# Patient Record
Sex: MALE | Race: WHITE | NOT HISPANIC OR LATINO | Employment: FULL TIME | URBAN - METROPOLITAN AREA
[De-identification: names, ages, dates, MRNs, and addresses within clinical notes are randomized per-mention and may not be internally consistent; named-entity substitution may affect disease eponyms.]

---
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
## 2022-08-26 ENCOUNTER — OFFICE VISIT (OUTPATIENT)
Dept: FAMILY MEDICINE CLINIC | Facility: CLINIC | Age: 22
End: 2022-08-26

## 2022-08-26 VITALS
SYSTOLIC BLOOD PRESSURE: 118 MMHG | WEIGHT: 168 LBS | HEIGHT: 70 IN | TEMPERATURE: 97.7 F | BODY MASS INDEX: 24.05 KG/M2 | RESPIRATION RATE: 16 BRPM | HEART RATE: 90 BPM | OXYGEN SATURATION: 98 % | DIASTOLIC BLOOD PRESSURE: 72 MMHG

## 2022-08-26 DIAGNOSIS — L05.01 PILONIDAL CYST WITH ABSCESS: Primary | ICD-10-CM

## 2022-08-26 PROCEDURE — 99213 OFFICE O/P EST LOW 20 MIN: CPT | Performed by: STUDENT IN AN ORGANIZED HEALTH CARE EDUCATION/TRAINING PROGRAM

## 2022-08-26 RX ORDER — SULFAMETHOXAZOLE AND TRIMETHOPRIM 800; 160 MG/1; MG/1
1 TABLET ORAL 2 TIMES DAILY
Qty: 28 TABLET | Refills: 0 | Status: SHIPPED | OUTPATIENT
Start: 2022-08-26 | End: 2022-09-06

## 2022-09-06 ENCOUNTER — OFFICE VISIT (OUTPATIENT)
Dept: SURGERY | Facility: CLINIC | Age: 22
End: 2022-09-06

## 2022-09-06 VITALS — BODY MASS INDEX: 28.65 KG/M2 | HEIGHT: 64 IN | WEIGHT: 167.8 LBS

## 2022-09-06 DIAGNOSIS — L05.91 PILONIDAL CYST: Primary | ICD-10-CM

## 2022-09-06 PROCEDURE — 99243 OFF/OP CNSLTJ NEW/EST LOW 30: CPT | Performed by: SURGERY

## 2022-09-06 RX ORDER — CEFAZOLIN SODIUM 2 G/100ML
2 INJECTION, SOLUTION INTRAVENOUS ONCE
Status: CANCELLED | OUTPATIENT
Start: 2022-09-26 | End: 2022-09-06

## 2022-09-06 NOTE — PROGRESS NOTES
Subjective   Alvarado Woods is a 21 y.o. male who presents to the office in surgical consultation from Nicholas Ding DO for pilonidal cyst.    History of Present Illness     The patient has a history of a pilonidal cyst that has been present for several years.  He has frequent drainage.  He developed increasing pain and swelling in the area about 2 weeks ago followed by a large amount of drainage with improvement in the pain.  He was seen by his primary care provider and started on Bactrim for an infected pilonidal cyst.  He continues to have pain in the area but it is improved.  There is still drainage.    Review of Systems   Constitutional: Negative for fatigue and fever.   Respiratory: Negative for chest tightness and shortness of breath.    Cardiovascular: Negative for chest pain and palpitations.   Gastrointestinal: Negative for abdominal pain, blood in stool, constipation, diarrhea, nausea and vomiting.     Past Medical History:   Diagnosis Date   • Acid reflux    • Anxiety    • Back problem      Past Surgical History:   Procedure Laterality Date   • HERNIA REPAIR       Family History   Problem Relation Age of Onset   • Kidney disease Father    • Hypertension Father    • Heart disease Father    • Diabetes Father    • Alcohol abuse Father    • Asthma Father    • Cancer Father    • Other Father    • Crohn's disease Father    • Other Sister    • Cancer Paternal Grandmother    • Colon cancer Neg Hx    • Colon polyps Neg Hx      Social History     Socioeconomic History   • Marital status: Single   Tobacco Use   • Smoking status: Light Tobacco Smoker     Packs/day: 0.25     Types: Cigarettes   • Smokeless tobacco: Never Used   Vaping Use   • Vaping Use: Never used   Substance and Sexual Activity   • Alcohol use: Defer     Comment: tried    • Drug use: Yes     Types: Marijuana   • Sexual activity: Yes     Partners: Female     Birth control/protection: Condom       Objective   Physical Exam  Constitutional:        Appearance: Normal appearance. He is well-developed. He is not toxic-appearing.   Eyes:      General: No scleral icterus.  Pulmonary:      Effort: Pulmonary effort is normal. No respiratory distress.   Skin:     General: Skin is warm and dry.      Comments: There is a pilonidal cyst at the superior margin of the gluteal fold with minimal faint erythema and 3 sinus tracts.  There is no palpable fluctuance.   Neurological:      Mental Status: He is alert and oriented to person, place, and time.   Psychiatric:         Behavior: Behavior normal.         Thought Content: Thought content normal.         Judgment: Judgment normal.         Assessment & Plan     1.  Pilonidal cyst: The patient has a chronic pilonidal cyst that recently became infected and improved after spontaneous drainage and treatment with antibiotics.  He continues to have drainage from the area and some ongoing pain.  This was discussed with him.  An excision of the pilonidal cyst was also discussed with him including potential complications such as an open wound requiring dressing changes.  He also understands a high recurrence rate.  He would like to proceed with surgery.  He will be scheduled for an excision of the pilonidal cyst.  The patient understands the indications, alternatives, risks, and benefits of the procedure and wishes to proceed.

## 2022-09-09 ENCOUNTER — PATIENT ROUNDING (BHMG ONLY) (OUTPATIENT)
Dept: SURGERY | Facility: CLINIC | Age: 22
End: 2022-09-09

## 2022-09-09 NOTE — PROGRESS NOTES
September 9, 2022          I am calling to officially welcome you to our practice and ask about your recent visit. Is this a good time to talk? No. Left voicemail to call back.

## 2022-09-26 ENCOUNTER — ANESTHESIA EVENT (OUTPATIENT)
Dept: PERIOP | Facility: HOSPITAL | Age: 22
End: 2022-09-26

## 2022-09-26 ENCOUNTER — ANESTHESIA (OUTPATIENT)
Dept: PERIOP | Facility: HOSPITAL | Age: 22
End: 2022-09-26

## 2022-09-26 ENCOUNTER — HOSPITAL ENCOUNTER (OUTPATIENT)
Facility: HOSPITAL | Age: 22
Setting detail: HOSPITAL OUTPATIENT SURGERY
Discharge: HOME | End: 2022-09-26
Attending: SURGERY | Admitting: SURGERY

## 2022-09-26 VITALS
BODY MASS INDEX: 24.65 KG/M2 | HEART RATE: 88 BPM | TEMPERATURE: 97.8 F | HEIGHT: 69 IN | OXYGEN SATURATION: 97 % | DIASTOLIC BLOOD PRESSURE: 100 MMHG | SYSTOLIC BLOOD PRESSURE: 122 MMHG | RESPIRATION RATE: 16 BRPM | WEIGHT: 166.45 LBS

## 2022-09-26 DIAGNOSIS — L05.91 PILONIDAL CYST: ICD-10-CM

## 2022-09-26 PROCEDURE — 25010000002 PROPOFOL 10 MG/ML EMULSION: Performed by: STUDENT IN AN ORGANIZED HEALTH CARE EDUCATION/TRAINING PROGRAM

## 2022-09-26 PROCEDURE — 88304 TISSUE EXAM BY PATHOLOGIST: CPT | Performed by: SURGERY

## 2022-09-26 PROCEDURE — 25010000002 FENTANYL CITRATE (PF) 100 MCG/2ML SOLUTION: Performed by: STUDENT IN AN ORGANIZED HEALTH CARE EDUCATION/TRAINING PROGRAM

## 2022-09-26 PROCEDURE — 25010000002 DEXAMETHASONE SODIUM PHOSPHATE 20 MG/5ML SOLUTION: Performed by: STUDENT IN AN ORGANIZED HEALTH CARE EDUCATION/TRAINING PROGRAM

## 2022-09-26 PROCEDURE — 11770 EXC PILONIDAL CYST SIMPLE: CPT | Performed by: SURGERY

## 2022-09-26 PROCEDURE — 25010000002 FENTANYL CITRATE (PF) 50 MCG/ML SOLUTION: Performed by: STUDENT IN AN ORGANIZED HEALTH CARE EDUCATION/TRAINING PROGRAM

## 2022-09-26 PROCEDURE — 25010000002 CEFAZOLIN IN DEXTROSE 2-4 GM/100ML-% SOLUTION: Performed by: SURGERY

## 2022-09-26 PROCEDURE — 25010000002 ONDANSETRON PER 1 MG: Performed by: STUDENT IN AN ORGANIZED HEALTH CARE EDUCATION/TRAINING PROGRAM

## 2022-09-26 RX ORDER — IBUPROFEN 600 MG/1
600 TABLET ORAL ONCE AS NEEDED
Status: DISCONTINUED | OUTPATIENT
Start: 2022-09-26 | End: 2022-09-26 | Stop reason: HOSPADM

## 2022-09-26 RX ORDER — HYDROMORPHONE HYDROCHLORIDE 1 MG/ML
0.5 INJECTION, SOLUTION INTRAMUSCULAR; INTRAVENOUS; SUBCUTANEOUS
Status: DISCONTINUED | OUTPATIENT
Start: 2022-09-26 | End: 2022-09-26 | Stop reason: HOSPADM

## 2022-09-26 RX ORDER — DEXAMETHASONE SODIUM PHOSPHATE 4 MG/ML
INJECTION, SOLUTION INTRA-ARTICULAR; INTRALESIONAL; INTRAMUSCULAR; INTRAVENOUS; SOFT TISSUE AS NEEDED
Status: DISCONTINUED | OUTPATIENT
Start: 2022-09-26 | End: 2022-09-26 | Stop reason: SURG

## 2022-09-26 RX ORDER — FENTANYL CITRATE 50 UG/ML
50 INJECTION, SOLUTION INTRAMUSCULAR; INTRAVENOUS
Status: DISCONTINUED | OUTPATIENT
Start: 2022-09-26 | End: 2022-09-26 | Stop reason: HOSPADM

## 2022-09-26 RX ORDER — ONDANSETRON 2 MG/ML
INJECTION INTRAMUSCULAR; INTRAVENOUS AS NEEDED
Status: DISCONTINUED | OUTPATIENT
Start: 2022-09-26 | End: 2022-09-26 | Stop reason: SURG

## 2022-09-26 RX ORDER — ROCURONIUM BROMIDE 10 MG/ML
INJECTION, SOLUTION INTRAVENOUS AS NEEDED
Status: DISCONTINUED | OUTPATIENT
Start: 2022-09-26 | End: 2022-09-26 | Stop reason: SURG

## 2022-09-26 RX ORDER — LIDOCAINE HYDROCHLORIDE 10 MG/ML
0.5 INJECTION, SOLUTION EPIDURAL; INFILTRATION; INTRACAUDAL; PERINEURAL ONCE AS NEEDED
Status: DISCONTINUED | OUTPATIENT
Start: 2022-09-26 | End: 2022-09-26 | Stop reason: HOSPADM

## 2022-09-26 RX ORDER — FLUMAZENIL 0.1 MG/ML
0.2 INJECTION INTRAVENOUS AS NEEDED
Status: DISCONTINUED | OUTPATIENT
Start: 2022-09-26 | End: 2022-09-26 | Stop reason: HOSPADM

## 2022-09-26 RX ORDER — PROMETHAZINE HYDROCHLORIDE 25 MG/1
25 TABLET ORAL ONCE AS NEEDED
Status: DISCONTINUED | OUTPATIENT
Start: 2022-09-26 | End: 2022-09-26 | Stop reason: HOSPADM

## 2022-09-26 RX ORDER — DIPHENHYDRAMINE HCL 25 MG
25 CAPSULE ORAL
Status: DISCONTINUED | OUTPATIENT
Start: 2022-09-26 | End: 2022-09-26 | Stop reason: HOSPADM

## 2022-09-26 RX ORDER — HYDROCODONE BITARTRATE AND ACETAMINOPHEN 5; 325 MG/1; MG/1
1 TABLET ORAL EVERY 6 HOURS PRN
Qty: 20 TABLET | Refills: 0 | Status: SHIPPED | OUTPATIENT
Start: 2022-09-26 | End: 2022-10-04 | Stop reason: SDUPTHER

## 2022-09-26 RX ORDER — ACETAMINOPHEN 325 MG/1
650 TABLET ORAL EVERY 6 HOURS PRN
COMMUNITY

## 2022-09-26 RX ORDER — HYDRALAZINE HYDROCHLORIDE 20 MG/ML
5 INJECTION INTRAMUSCULAR; INTRAVENOUS
Status: DISCONTINUED | OUTPATIENT
Start: 2022-09-26 | End: 2022-09-26 | Stop reason: HOSPADM

## 2022-09-26 RX ORDER — MIDAZOLAM HYDROCHLORIDE 1 MG/ML
1 INJECTION INTRAMUSCULAR; INTRAVENOUS
Status: DISCONTINUED | OUTPATIENT
Start: 2022-09-26 | End: 2022-09-26 | Stop reason: HOSPADM

## 2022-09-26 RX ORDER — NALOXONE HCL 0.4 MG/ML
0.2 VIAL (ML) INJECTION AS NEEDED
Status: DISCONTINUED | OUTPATIENT
Start: 2022-09-26 | End: 2022-09-26 | Stop reason: HOSPADM

## 2022-09-26 RX ORDER — DIPHENHYDRAMINE HYDROCHLORIDE 50 MG/ML
12.5 INJECTION INTRAMUSCULAR; INTRAVENOUS
Status: DISCONTINUED | OUTPATIENT
Start: 2022-09-26 | End: 2022-09-26 | Stop reason: HOSPADM

## 2022-09-26 RX ORDER — FAMOTIDINE 10 MG/ML
20 INJECTION, SOLUTION INTRAVENOUS ONCE
Status: COMPLETED | OUTPATIENT
Start: 2022-09-26 | End: 2022-09-26

## 2022-09-26 RX ORDER — EPHEDRINE SULFATE 50 MG/ML
5 INJECTION, SOLUTION INTRAVENOUS ONCE AS NEEDED
Status: DISCONTINUED | OUTPATIENT
Start: 2022-09-26 | End: 2022-09-26 | Stop reason: HOSPADM

## 2022-09-26 RX ORDER — LIDOCAINE HYDROCHLORIDE 20 MG/ML
INJECTION, SOLUTION INFILTRATION; PERINEURAL AS NEEDED
Status: DISCONTINUED | OUTPATIENT
Start: 2022-09-26 | End: 2022-09-26 | Stop reason: SURG

## 2022-09-26 RX ORDER — OXYCODONE AND ACETAMINOPHEN 7.5; 325 MG/1; MG/1
1 TABLET ORAL EVERY 4 HOURS PRN
Status: DISCONTINUED | OUTPATIENT
Start: 2022-09-26 | End: 2022-09-26 | Stop reason: HOSPADM

## 2022-09-26 RX ORDER — FENTANYL CITRATE 50 UG/ML
INJECTION, SOLUTION INTRAMUSCULAR; INTRAVENOUS AS NEEDED
Status: DISCONTINUED | OUTPATIENT
Start: 2022-09-26 | End: 2022-09-26 | Stop reason: SURG

## 2022-09-26 RX ORDER — CEFAZOLIN SODIUM 2 G/100ML
2 INJECTION, SOLUTION INTRAVENOUS ONCE
Status: COMPLETED | OUTPATIENT
Start: 2022-09-26 | End: 2022-09-26

## 2022-09-26 RX ORDER — HYDROCODONE BITARTRATE AND ACETAMINOPHEN 7.5; 325 MG/1; MG/1
1 TABLET ORAL ONCE AS NEEDED
Status: DISCONTINUED | OUTPATIENT
Start: 2022-09-26 | End: 2022-09-26 | Stop reason: HOSPADM

## 2022-09-26 RX ORDER — BUPIVACAINE HYDROCHLORIDE AND EPINEPHRINE 5; 5 MG/ML; UG/ML
INJECTION, SOLUTION EPIDURAL; INTRACAUDAL; PERINEURAL AS NEEDED
Status: DISCONTINUED | OUTPATIENT
Start: 2022-09-26 | End: 2022-09-26 | Stop reason: HOSPADM

## 2022-09-26 RX ORDER — LABETALOL HYDROCHLORIDE 5 MG/ML
5 INJECTION, SOLUTION INTRAVENOUS
Status: DISCONTINUED | OUTPATIENT
Start: 2022-09-26 | End: 2022-09-26 | Stop reason: HOSPADM

## 2022-09-26 RX ORDER — ONDANSETRON 2 MG/ML
4 INJECTION INTRAMUSCULAR; INTRAVENOUS ONCE AS NEEDED
Status: DISCONTINUED | OUTPATIENT
Start: 2022-09-26 | End: 2022-09-26 | Stop reason: HOSPADM

## 2022-09-26 RX ORDER — SODIUM CHLORIDE, SODIUM LACTATE, POTASSIUM CHLORIDE, CALCIUM CHLORIDE 600; 310; 30; 20 MG/100ML; MG/100ML; MG/100ML; MG/100ML
9 INJECTION, SOLUTION INTRAVENOUS CONTINUOUS
Status: DISCONTINUED | OUTPATIENT
Start: 2022-09-26 | End: 2022-09-26 | Stop reason: HOSPADM

## 2022-09-26 RX ORDER — SODIUM CHLORIDE 0.9 % (FLUSH) 0.9 %
3-10 SYRINGE (ML) INJECTION AS NEEDED
Status: DISCONTINUED | OUTPATIENT
Start: 2022-09-26 | End: 2022-09-26 | Stop reason: HOSPADM

## 2022-09-26 RX ORDER — PROMETHAZINE HYDROCHLORIDE 25 MG/1
25 SUPPOSITORY RECTAL ONCE AS NEEDED
Status: DISCONTINUED | OUTPATIENT
Start: 2022-09-26 | End: 2022-09-26 | Stop reason: HOSPADM

## 2022-09-26 RX ORDER — SODIUM CHLORIDE 0.9 % (FLUSH) 0.9 %
3 SYRINGE (ML) INJECTION EVERY 12 HOURS SCHEDULED
Status: DISCONTINUED | OUTPATIENT
Start: 2022-09-26 | End: 2022-09-26 | Stop reason: HOSPADM

## 2022-09-26 RX ORDER — PROPOFOL 10 MG/ML
VIAL (ML) INTRAVENOUS AS NEEDED
Status: DISCONTINUED | OUTPATIENT
Start: 2022-09-26 | End: 2022-09-26 | Stop reason: SURG

## 2022-09-26 RX ADMIN — OXYCODONE HYDROCHLORIDE AND ACETAMINOPHEN 1 TABLET: 7.5; 325 TABLET ORAL at 16:55

## 2022-09-26 RX ADMIN — FENTANYL CITRATE 50 MCG: 50 INJECTION INTRAMUSCULAR; INTRAVENOUS at 16:55

## 2022-09-26 RX ADMIN — LIDOCAINE HYDROCHLORIDE 100 MG: 20 INJECTION, SOLUTION INFILTRATION; PERINEURAL at 15:23

## 2022-09-26 RX ADMIN — FENTANYL CITRATE 25 MCG: 50 INJECTION, SOLUTION INTRAMUSCULAR; INTRAVENOUS at 15:49

## 2022-09-26 RX ADMIN — ROCURONIUM BROMIDE 50 MG: 50 INJECTION INTRAVENOUS at 15:24

## 2022-09-26 RX ADMIN — PROPOFOL 200 MG: 10 INJECTION, EMULSION INTRAVENOUS at 15:23

## 2022-09-26 RX ADMIN — FENTANYL CITRATE 25 MCG: 50 INJECTION, SOLUTION INTRAMUSCULAR; INTRAVENOUS at 15:45

## 2022-09-26 RX ADMIN — FAMOTIDINE 20 MG: 10 INJECTION INTRAVENOUS at 12:49

## 2022-09-26 RX ADMIN — ONDANSETRON 4 MG: 2 INJECTION INTRAMUSCULAR; INTRAVENOUS at 15:59

## 2022-09-26 RX ADMIN — DEXAMETHASONE SODIUM PHOSPHATE 8 MG: 4 INJECTION, SOLUTION INTRAMUSCULAR; INTRAVENOUS at 15:38

## 2022-09-26 RX ADMIN — SUGAMMADEX 200 MG: 100 INJECTION, SOLUTION INTRAVENOUS at 16:18

## 2022-09-26 RX ADMIN — SODIUM CHLORIDE, POTASSIUM CHLORIDE, SODIUM LACTATE AND CALCIUM CHLORIDE 9 ML/HR: 600; 310; 30; 20 INJECTION, SOLUTION INTRAVENOUS at 12:49

## 2022-09-26 RX ADMIN — CEFAZOLIN SODIUM 2 G: 2 INJECTION, SOLUTION INTRAVENOUS at 15:06

## 2022-09-26 NOTE — ANESTHESIA PREPROCEDURE EVALUATION
Anesthesia Evaluation                  Airway   TM distance: >3 FB  Neck ROM: full  No difficulty expected  Dental - normal exam     Pulmonary - normal exam   Cardiovascular - normal exam        Neuro/Psych  (+) psychiatric history Anxiety,    GI/Hepatic/Renal/Endo    (+)  GERD,      Musculoskeletal     Abdominal    Substance History      OB/GYN          Other                        Anesthesia Plan    ASA 1     general     intravenous induction     Anesthetic plan, risks, benefits, and alternatives have been provided, discussed and informed consent has been obtained with: patient.        CODE STATUS:

## 2022-09-26 NOTE — ANESTHESIA POSTPROCEDURE EVALUATION
"Patient: Alvarado Woods    Procedure Summary     Date: 09/26/22 Room / Location: [facility] OR  / [facility] MAIN OR    Anesthesia Start: 1518 Anesthesia Stop: 1636    Procedure: Excision of pilonidal cyst (N/A Back) Diagnosis:       Pilonidal cyst      (Pilonidal cyst [L05.91])    Surgeons: Alexi Momin Jr., MD Provider: Sayra, Luis Mattson MD    Anesthesia Type: general ASA Status: 1          Anesthesia Type: general    Vitals  Vitals Value Taken Time   /79 09/26/22 1731   Temp 36.6 °C (97.8 °F) 09/26/22 1725   Pulse 71 09/26/22 1734   Resp 14 09/26/22 1715   SpO2 95 % 09/26/22 1734   Vitals shown include unvalidated device data.        Post Anesthesia Care and Evaluation    Patient location during evaluation: bedside  Patient participation: complete - patient participated  Level of consciousness: awake and alert  Pain management: adequate    Airway patency: patent  Anesthetic complications: No anesthetic complications    Cardiovascular status: acceptable  Respiratory status: acceptable  Hydration status: acceptable    Comments: /64   Pulse 68   Temp 36.6 °C (97.8 °F) (Oral)   Resp 16   Ht 175.3 cm (69\")   Wt 75.5 kg (166 lb 7.2 oz)   SpO2 96%   BMI 24.58 kg/m²       "

## 2022-09-26 NOTE — ANESTHESIA PROCEDURE NOTES
Airway  Urgency: elective    Date/Time: 9/26/2022 3:30 PM  Airway not difficult    General Information and Staff    Patient location during procedure: OR  Anesthesiologist: Luis Colbert MD  CRNA/CAA: Mack Wells CRNA    Indications and Patient Condition  Indications for airway management: airway protection    Preoxygenated: yes  MILS not maintained throughout  Mask difficulty assessment: 1 - vent by mask    Final Airway Details  Final airway type: endotracheal airway      Successful airway: ETT  Cuffed: yes   Successful intubation technique: direct laryngoscopy  Endotracheal tube insertion site: oral  Blade: Danii  Blade size: 3  ETT size (mm): 8.0  Cormack-Lehane Classification: grade IIa - partial view of glottis  Placement verified by: chest auscultation and capnometry   Cuff volume (mL): 8  Measured from: lips  ETT/EBT  to lips (cm): 21  Number of attempts at approach: 1  Assessment: lips, teeth, and gum same as pre-op and atraumatic intubation

## 2022-09-28 LAB
LAB AP CASE REPORT: NORMAL
PATH REPORT.FINAL DX SPEC: NORMAL
PATH REPORT.GROSS SPEC: NORMAL

## 2022-10-04 ENCOUNTER — OFFICE VISIT (OUTPATIENT)
Dept: SURGERY | Facility: CLINIC | Age: 22
End: 2022-10-04

## 2022-10-04 ENCOUNTER — TELEPHONE (OUTPATIENT)
Dept: SURGERY | Facility: CLINIC | Age: 22
End: 2022-10-04

## 2022-10-04 ENCOUNTER — OFFICE VISIT (OUTPATIENT)
Dept: FAMILY MEDICINE CLINIC | Facility: CLINIC | Age: 22
End: 2022-10-04

## 2022-10-04 VITALS — WEIGHT: 168 LBS | HEIGHT: 69 IN | BODY MASS INDEX: 24.88 KG/M2

## 2022-10-04 VITALS
BODY MASS INDEX: 25.03 KG/M2 | OXYGEN SATURATION: 97 % | HEIGHT: 69 IN | DIASTOLIC BLOOD PRESSURE: 72 MMHG | SYSTOLIC BLOOD PRESSURE: 114 MMHG | WEIGHT: 169 LBS | TEMPERATURE: 99.1 F | HEART RATE: 93 BPM

## 2022-10-04 DIAGNOSIS — L05.91 PILONIDAL CYST: ICD-10-CM

## 2022-10-04 DIAGNOSIS — Z48.89 POSTOPERATIVE VISIT: Primary | ICD-10-CM

## 2022-10-04 DIAGNOSIS — L05.91 PILONIDAL CYST: Primary | ICD-10-CM

## 2022-10-04 PROCEDURE — 99024 POSTOP FOLLOW-UP VISIT: CPT | Performed by: SURGERY

## 2022-10-04 RX ORDER — HYDROCODONE BITARTRATE AND ACETAMINOPHEN 5; 325 MG/1; MG/1
1 TABLET ORAL EVERY 6 HOURS PRN
Qty: 20 TABLET | Refills: 0 | Status: SHIPPED | OUTPATIENT
Start: 2022-10-04

## 2022-10-04 NOTE — PROGRESS NOTES
Patient was seen in the office and stated that he was felt like he heard and felt a pop in the area of the surgery 3 days ago and started soaking through gauze.  He is having to change dressing every hour or so.  On exam it appears the surgical wound is open and closing suture is missing.  Called general surgery and was able to make and appointment with his surgeon today at 1:50.  Patient understands that he needs to go appointment for further care.

## 2022-10-04 NOTE — PROGRESS NOTES
Subjective   Alvarado Woods is a 22 y.o. male who returns to the office after undergoing an excision of a pilonidal cyst on 9/26/2022.     History of Present Illness     The patient returns to the office today after developing drainage that he perceived as bleeding.  He is having increased pain in the region of the pilonidal cyst excision site over the past 2 or 3 days.  He has not had any fevers nor chills.    Review of Systems   Constitutional: Negative for fatigue and fever.   Gastrointestinal: Negative for abdominal pain and nausea.       Objective   Physical Exam  Constitutional:       General: He is not in acute distress.     Appearance: Normal appearance. He is not ill-appearing or toxic-appearing.   Pulmonary:      Effort: Pulmonary effort is normal. No respiratory distress.   Skin:     Comments: Wound: Open with seroma draining but no evidence of infection.   Neurological:      Mental Status: He is alert.   Psychiatric:         Behavior: Behavior normal.         Assessment & Plan     1.  Postoperative visit: The patient is recovering well from the excision of a pilonidal cyst but he has developed a seroma that is draining.  The incisions were removed and the wound was completely opened to allow drainage.  He was instructed on normal saline wet-to-dry dressing changes to be performed once a day.  A prescription for saline and gauze pad was sent to his pharmacy.  A prescription for hydrocodone was also sent.  He will follow-up in 2 weeks.

## 2022-10-04 NOTE — TELEPHONE ENCOUNTER
Patient called and lvm on the clinical line stating that his Rx for saline ws not in stock at his local [facility]. Called [facility]. They advised that they can order this and have it in as early as tomorrow.   Called patient back and got a hold of his mother (cell noted in pt chart is mom's cell. no other phone number on file). Advised to go ahead and  some OTC eye saline to use until the order arrives. Mom will relay the message.

## 2022-10-19 ENCOUNTER — HOSPITAL ENCOUNTER (EMERGENCY)
Facility: HOSPITAL | Age: 22
End: 2022-10-19

## 2022-11-03 ENCOUNTER — OFFICE VISIT (OUTPATIENT)
Dept: SURGERY | Facility: CLINIC | Age: 22
End: 2022-11-03

## 2022-11-03 VITALS — BODY MASS INDEX: 24.59 KG/M2 | HEIGHT: 69 IN | WEIGHT: 166 LBS

## 2022-11-03 DIAGNOSIS — Z48.89 POSTOPERATIVE VISIT: Primary | ICD-10-CM

## 2022-11-03 PROCEDURE — 99024 POSTOP FOLLOW-UP VISIT: CPT | Performed by: SURGERY

## 2022-11-03 NOTE — PROGRESS NOTES
Subjective   Alvarado Woods is a 22 y.o. male who returns to the office after undergoing an excision of the pilonidal cyst on 9/26/2022.     History of Present Illness     The patient is recovering well with no complaints except for some drainage.  He works in lawn care mowing lawns and is able to get through about 4 hours before the area starts hurting.  The bouncing and vibrating of the lawnmower causes some pain.  He has also noticed some drainage.    Review of Systems   Constitutional: Negative for fatigue and fever.   Skin: Positive for wound. Negative for rash.       Objective   Physical Exam  Constitutional:       Appearance: He is not ill-appearing or toxic-appearing.   Skin:     Comments: The wound in the gluteal fold is open with a clean base of granulation tissue that is flush with the skin.  There is no evidence of infection.   Neurological:      Mental Status: He is alert.   Psychiatric:         Behavior: Behavior is cooperative.         Assessment & Plan     1.  Postoperative visit: The patient is recovering well from the excision of a pilonidal cyst.  He has an open wound with a clean base of granulation tissue that is healing well.  Since it is flush with the skin, there is no need for normal saline wet-to-dry dressing changes.  He was advised to use a dry dressing to encourage scab formation.  Once the scab is formed, no further dressing changes will be necessary.  He will follow-up on an as-needed basis.

## 2025-03-06 ENCOUNTER — OFFICE VISIT (OUTPATIENT)
Dept: FAMILY MEDICINE CLINIC | Facility: CLINIC | Age: 25
End: 2025-03-06
Payer: COMMERCIAL

## 2025-03-06 VITALS
DIASTOLIC BLOOD PRESSURE: 106 MMHG | WEIGHT: 177.8 LBS | HEIGHT: 69 IN | OXYGEN SATURATION: 98 % | SYSTOLIC BLOOD PRESSURE: 134 MMHG | HEART RATE: 101 BPM | BODY MASS INDEX: 26.33 KG/M2

## 2025-03-06 DIAGNOSIS — F10.20 ALCOHOL USE DISORDER, SEVERE, DEPENDENCE: Primary | ICD-10-CM

## 2025-03-06 PROCEDURE — 99213 OFFICE O/P EST LOW 20 MIN: CPT | Performed by: STUDENT IN AN ORGANIZED HEALTH CARE EDUCATION/TRAINING PROGRAM

## 2025-03-13 ENCOUNTER — OFFICE VISIT (OUTPATIENT)
Dept: FAMILY MEDICINE CLINIC | Facility: CLINIC | Age: 25
End: 2025-03-13
Payer: COMMERCIAL

## 2025-03-13 VITALS
HEART RATE: 103 BPM | WEIGHT: 180 LBS | DIASTOLIC BLOOD PRESSURE: 92 MMHG | OXYGEN SATURATION: 98 % | RESPIRATION RATE: 16 BRPM | SYSTOLIC BLOOD PRESSURE: 136 MMHG | HEIGHT: 69 IN | BODY MASS INDEX: 26.66 KG/M2

## 2025-03-13 DIAGNOSIS — F10.930 ALCOHOL WITHDRAWAL SYNDROME WITHOUT COMPLICATION: Primary | ICD-10-CM

## 2025-03-13 PROCEDURE — 99213 OFFICE O/P EST LOW 20 MIN: CPT | Performed by: STUDENT IN AN ORGANIZED HEALTH CARE EDUCATION/TRAINING PROGRAM

## 2025-03-13 RX ORDER — CHLORDIAZEPOXIDE HYDROCHLORIDE 25 MG/1
CAPSULE, GELATIN COATED ORAL
Qty: 20 CAPSULE | Refills: 0 | Status: SHIPPED | OUTPATIENT
Start: 2025-03-13 | End: 2025-03-17

## 2025-03-13 NOTE — PROGRESS NOTES
Nicholas Ding DO  [facility]  [address]  [place] [id number] [phone]    Subjective      Name Alvarado Woods MRN [id number]    2000 AGE/SEX 24 y.o. / male      Chief Complaint Chief Complaint   Patient presents with    Withdrawal     Has been drinking heavily for years and has been trying to cut back / stop on his own. Has been having anxiety, shaking, feels like he's going to pass out. Has not been drinking during the day and feels unwell but then at nighttime feels better once he starts drinking          Visit History for  2025    Alvarado Woods is a 24 y.o. male who presented today for Withdrawal (Has been drinking heavily for years and has been trying to cut back / stop on his own. Has been having anxiety, shaking, feels like he's going to pass out. Has not been drinking during the day and feels unwell but then at nighttime feels better once he starts drinking )       History of Present Illness  He has been making efforts to cease alcohol consumption due to its interference with his full-time employment. He reports experiencing tremors, akin to those experienced during a seizure or stroke, accompanied by chest pain and generalized weakness when abstaining from alcohol. His daily intake includes at least a pint or 1/5 of alcohol at night, a habit that has persisted since the end of . He estimates spending approximately 20,000 to 25,000 dollars on alcohol. He also reports chest discomfort associated with anxiety and nervousness, which he did not experience prior to his alcohol use. Despite being able to abstain from alcohol until nighttime, he experiences jitteriness and anxiety throughout the day. His last drink was consumed the previous night. He does not report any current nausea or vomiting but admits to feeling slightly warm earlier. He rates his nervousness at 6 or 7 on a scale of 1 to 10. He reports constant restlessness and  "occasional tingling in his neck, which becomes warm. He does not report any hypersensitivity to sounds or light, but notes that bright light can cause eye discomfort when outdoors. He reports feeling slightly warm and identifies the current date as 03/06/2025. He expresses fear of experiencing seizures during withdrawal and prefers hard liquor over beer. He recalls a period a few months ago when he reduced his intake to two shots, which allowed him to sleep, but resulted in daytime lightheadedness, dizziness, and tachycardia, necessitating rest and hydration. He is currently consuming alcohol with a 40 percent content. He inquires about over-the-counter medications for nerve calming and expresses interest in rehabilitation. He resides with others but maintains a separate living space in the basement. He plans to attempt abstinence from alcohol tomorrow. 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    SOCIAL HISTORY  The patient admits to drinking at least a pint or 1/5 of alcohol every night since the end of 2022. He does not smoke. He resides with others but maintains a separate living space in the basement.       Medications and Allergies   Current Outpatient Medications   Medication Instructions    acetaminophen (TYLENOL) 650 mg, Every 6 Hours PRN    HYDROcodone-acetaminophen (NORCO) 5-325 MG per tablet Take 1 tablet by mouth Every 6 (Six) Hours As Needed for pain.     No Known Allergies   I have reviewed the above medications and allergies     Objective:      Vitals Vitals:    03/06/25 1525   BP: (!) 134/106   BP Location: Left arm   Patient Position: Sitting   Cuff Size: Adult   Pulse: 101   SpO2: 98%   Weight: 80.6 kg (177 lb 12.8 oz)   Height: 175.3 cm (69\")     Body mass index is 26.26 kg/m².    Physical Exam  Vitals reviewed.   Constitutional:       General: He is not in acute distress.     Appearance: He is not ill-appearing. "   Pulmonary:      Effort: Pulmonary effort is normal.   Psychiatric:         Mood and Affect: Mood normal.         Behavior: Behavior normal.         Thought Content: Thought content normal.         Judgment: Judgment normal.          Physical Exam       Results       Assessment/Plan   Issues Addressed/ Plan   Diagnosis Plan   1. Alcohol use disorder, severe, dependence           Assessment & Plan  1. Alcohol dependence.  His CIWA score is currently 15, indicating a need for medication. His blood pressure is elevated due to withdrawal symptoms. He has not previously experienced a full-blown withdrawal. A comprehensive discussion was held regarding the potential risks associated with alcohol withdrawal, including the possibility of delirium tremens (DTs) and subsequent brain damage. He was advised against abrupt cessation of alcohol consumption due to his body's dependence on it. A gradual reduction in alcohol intake was recommended, with a suggestion to switch to a lower alcohol content beverage such as beer. He was encouraged to monitor his daily alcohol consumption and gradually reduce it by one glass. He was also advised to inform his housemates about his condition and request their assistance in monitoring him. He was instructed to abstain from alcohol for 24 hours and then take medication to facilitate further abstinence. If he experiences severe withdrawal symptoms, he should seek immediate medical attention at the hospital.    Follow-up  The patient will follow up in 1 week.          There are no Patient Instructions on file for this visit.   Follow up  recommended Return in about 1 week (around 3/13/2025).   - Dragon voice recognition software was utilized to complete this chart.  Every reasonable attempt was made to edit and correct the text, however some incorrect words may remain.   Nicholas Ding DO    Patient or patient representative verbalized consent for the use of Ambient Listening during the visit  with  Nicholas Ding DO for chart documentation. 3/24/2025  01:05 EDT

## 2025-03-24 ENCOUNTER — OFFICE VISIT (OUTPATIENT)
Dept: FAMILY MEDICINE CLINIC | Facility: CLINIC | Age: 25
End: 2025-03-24
Payer: COMMERCIAL

## 2025-03-24 VITALS
SYSTOLIC BLOOD PRESSURE: 102 MMHG | RESPIRATION RATE: 16 BRPM | DIASTOLIC BLOOD PRESSURE: 84 MMHG | HEART RATE: 89 BPM | WEIGHT: 186 LBS | OXYGEN SATURATION: 97 % | BODY MASS INDEX: 27.55 KG/M2 | HEIGHT: 69 IN

## 2025-03-24 DIAGNOSIS — F10.20 ALCOHOL USE DISORDER, SEVERE, DEPENDENCE: ICD-10-CM

## 2025-03-24 DIAGNOSIS — F51.01 PRIMARY INSOMNIA: Primary | ICD-10-CM

## 2025-03-24 PROCEDURE — 99214 OFFICE O/P EST MOD 30 MIN: CPT | Performed by: STUDENT IN AN ORGANIZED HEALTH CARE EDUCATION/TRAINING PROGRAM

## 2025-03-24 RX ORDER — TRAZODONE HYDROCHLORIDE 50 MG/1
50 TABLET ORAL NIGHTLY
Qty: 30 TABLET | Refills: 2 | Status: SHIPPED | OUTPATIENT
Start: 2025-03-24

## 2025-03-24 NOTE — PROGRESS NOTES
"    Nicholas Ding DO  [facility]  [address] [place] [address] [address]  [place] [id number] [phone]    Subjective      Name Alvarado Woods MRN [id number]    2000 AGE/SEX 24 y.o. / male      Chief Complaint Chief Complaint   Patient presents with    Primary Care Follow-Up     1 week follow up          Visit History for  2025    Alvarado Woods is a 24 y.o. male who presented today for Primary Care Follow-Up (1 week follow up )       History of Present Illness  The patient presents for evaluation of alcohol withdrawal.    He reports no improvement in his condition since the last visit. Initially, he was consuming 2 pints of alcohol daily, but has managed to reduce this to 1 pint. However, he experienced significant discomfort on the first day of this reduction, including difficulty sleeping. Over the subsequent 2 days, he further decreased his intake to 6 shots, which is slightly more than half a pint. He expresses concern about the potential for severe shakiness after a 72-hour period without alcohol. He also reports experiencing vivid dreams every night. He resides with his foster mother, who assists him with his medications. He works as a  and has been operating machinery for the past 2 days. He believes he could take a few days off work if necessary. He also mentions that he occasionally feels his heartbeat and experiences sweating and anxiety, but these symptoms are not as severe as when he was consuming 16 shots daily.         Medications and Allergies   Current Outpatient Medications   Medication Instructions    traZODone (DESYREL) 50 mg, Oral, Nightly     No Known Allergies   I have reviewed the above medications and allergies     Objective:      Vitals Vitals:    25 1137   BP: 136/92   BP Location: Left arm   Patient Position: Sitting   Cuff Size: Adult   Pulse: 103   Resp: 16   SpO2: 98%   Weight: 81.6 kg (180 lb)   Height: 175.3 cm (69\") "     Body mass index is 26.58 kg/m².    Physical Exam  Vitals reviewed.   Constitutional:       General: He is not in acute distress.     Appearance: He is not ill-appearing.   Pulmonary:      Effort: Pulmonary effort is normal.   Psychiatric:         Mood and Affect: Mood normal.         Behavior: Behavior normal.         Thought Content: Thought content normal.         Judgment: Judgment normal.          Physical Exam       Results       Assessment/Plan   Issues Addressed/ Plan   Diagnosis Plan   1. Alcohol withdrawal syndrome without complication  chlordiazePOXIDE (LIBRIUM) 25 MG capsule         Assessment & Plan  1. Alcohol withdrawal.  He has successfully reduced his alcohol consumption from 16 shots to 6 shots daily. He reports experiencing severe shakiness after 72 hours without alcohol and is concerned about this symptom. A prescription for chlordiazepoxide has been issued to manage his withdrawal symptoms. The dosage regimen is as follows: 50 mg every 6 hours on the first day, 50 mg every 8 hours on the second day, 50 mg every 12 hours on the third day, and finally, 50 mg once at night on the fourth day. He has been advised to abstain from driving or operating heavy machinery during this period. He has also been instructed to seek immediate medical attention if he experiences shortness of breath or increased agitation. He is encouraged to reduce his alcohol intake by one shot if possible. If he tolerates the medication well, he may skip the initial step of taking it every 6 hours and instead take it every 8 hours. He has been informed that withdrawal symptoms may persist for 7 to 12 days, during which he may feel agitated and have a strong desire to drink. He has been advised to take a few days off work due to the potential side effects of the medication. He has been instructed to contact the clinic if the medication dosage feels excessive.    Follow-up  The patient is scheduled for a follow-up visit on  Monday.          There are no Patient Instructions on file for this visit.   Follow up  recommended Return in about 3 days (around 3/16/2025).   - Dragon voice recognition software was utilized to complete this chart.  Every reasonable attempt was made to edit and correct the text, however some incorrect words may remain.   Nicholas Ding DO    Patient or patient representative verbalized consent for the use of Ambient Listening during the visit with  Nicholas Ding DO for chart documentation. 3/30/2025  21:31 EDT

## 2025-03-24 NOTE — PROGRESS NOTES
Nicholas Ding DO  [facility]  [address]  [place] [id number] [phone]    Subjective      Name Alvarado Woods MRN [id number]    2000 AGE/SEX 24 y.o. / male      Chief Complaint Chief Complaint   Patient presents with    Primary Care Follow-Up     Follow up          Visit History for  2025    Alvarado Woods is a 24 y.o. male who presented today for Primary Care Follow-Up (Follow up )       History of Present Illness  The patient presents for evaluation of alcohol use disorder and insomnia.    He has been experiencing fatigue, which he attributes to his inability to sleep the previous night. He reports difficulty initiating sleep, often lying down at 9:30 PM or 10:00 PM but not falling asleep until 1:30 AM. Even when he manages to fall asleep, he frequently wakes up after an hour and remains awake until 3:00 AM, after which he sleeps until morning. He typically wakes up at 7:00 AM and starts work at 8:00 AM. He expresses interest in trying a medication to aid his sleep.    He has successfully reduced his alcohol consumption, transitioning from whiskey to a couple of beers. He initiated this change on Tuesday but admits to consuming two beers during a golf outing on Thursday. Since then, he has abstained from alcohol. He has not been adhering to the recommended dosage schedule of every 6, 8, or 12 hours, instead opting to take two doses at night before bedtime. He reports no adverse effects from this regimen. Despite these efforts, he continues to struggle with sleep disturbances and experiences tremors. However, he notes a significant reduction in anxiety over the past six days. He acknowledges the challenge of maintaining sobriety, particularly in social settings where alcohol is present. He also mentions that he refrains from drinking when he has to drive.           Medications and Allergies   Current Outpatient Medications   Medication  "Instructions    traZODone (DESYREL) 50 mg, Oral, Nightly     No Known Allergies   I have reviewed the above medications and allergies     Objective:      Vitals Vitals:    03/24/25 0805   BP: 102/84   BP Location: Left arm   Patient Position: Sitting   Cuff Size: Adult   Pulse: 89   Resp: 16   SpO2: 97%   Weight: 84.4 kg (186 lb)   Height: 175.3 cm (69\")     Body mass index is 27.47 kg/m².    Physical Exam  Vitals reviewed.   Constitutional:       General: He is not in acute distress.     Appearance: He is not ill-appearing.   Pulmonary:      Effort: Pulmonary effort is normal.   Psychiatric:         Mood and Affect: Mood normal.         Behavior: Behavior normal.         Thought Content: Thought content normal.         Judgment: Judgment normal.          Physical Exam       Results  Laboratory Studies  Liver function and blood sugar levels were elevated during the last lab check.     Assessment/Plan   Issues Addressed/ Plan   Diagnosis Plan   1. Primary insomnia  traZODone (DESYREL) 50 MG tablet      2. Alcohol use disorder, severe, dependence           Assessment & Plan  1. Alcohol use disorder.  He has been advised to abstain from alcohol consumption, even in social settings, due to the risk of relapse. The potential benefits of this lifestyle change, including improved liver function and reduced blood glucose levels, have been discussed. He is encouraged to maintain a healthy lifestyle and avoid situations that may tempt him to drink.    2. Insomnia.  His insomnia is likely a result of alcohol withdrawal, which should resolve over time. A prescription for trazodone 50 mg has been provided, with instructions to take it 30 minutes to an hour before bedtime. He is advised to start with a 25 mg dose and increase to 50 mg if necessary. If the 50 mg dose is ineffective, he may increase to 75 mg or 100 mg, but should not exceed 150 mg. Potential side effects, including grogginess and the need for caffeine in the " morning, have been discussed. If insomnia persists, he is advised to return for further evaluation and potential alternative treatments.    Follow-up  The patient will follow up in 3 months for a physical examination.          There are no Patient Instructions on file for this visit.   Follow up  recommended Return in about 3 months (around 6/24/2025) for Annual physical.   - Dragon voice recognition software was utilized to complete this chart.  Every reasonable attempt was made to edit and correct the text, however some incorrect words may remain.   Nicholas Ding DO    Patient or patient representative verbalized consent for the use of Ambient Listening during the visit with  Nicholas Ding DO for chart documentation. 4/13/2025  21:41 EDT

## 2025-07-20 ENCOUNTER — HOSPITAL ENCOUNTER (EMERGENCY)
Facility: HOSPITAL | Age: 25
Discharge: HOME | End: 2025-07-20
Attending: EMERGENCY MEDICINE | Admitting: EMERGENCY MEDICINE
Payer: COMMERCIAL

## 2025-07-20 ENCOUNTER — APPOINTMENT (OUTPATIENT)
Dept: GENERAL RADIOLOGY | Facility: HOSPITAL | Age: 25
End: 2025-07-20
Payer: COMMERCIAL

## 2025-07-20 VITALS
DIASTOLIC BLOOD PRESSURE: 96 MMHG | OXYGEN SATURATION: 97 % | RESPIRATION RATE: 16 BRPM | SYSTOLIC BLOOD PRESSURE: 145 MMHG | BODY MASS INDEX: 25.18 KG/M2 | WEIGHT: 170 LBS | HEART RATE: 78 BPM | HEIGHT: 69 IN | TEMPERATURE: 98.3 F

## 2025-07-20 DIAGNOSIS — S63.681A OTHER SPRAIN OF RIGHT THUMB, INITIAL ENCOUNTER: Primary | ICD-10-CM

## 2025-07-20 PROCEDURE — 99283 EMERGENCY DEPT VISIT LOW MDM: CPT | Performed by: EMERGENCY MEDICINE

## 2025-07-20 PROCEDURE — 73130 X-RAY EXAM OF HAND: CPT

## 2025-07-20 NOTE — DISCHARGE INSTRUCTIONS
Ice the right thumb for 20 minutes every 2-3 hours while awake for 2 to 3 days.  Take Motrin or Tylenol as needed as directed for pain.  Call [facility] on Monday for follow-up appointment within 1 week.  Return to the emergency department if you have increased pain, numbness, tingling, weakness, change in color or temperature of the right upper extremity, worse in any way at all.

## 2025-07-20 NOTE — ED PROVIDER NOTES
Subjective   History of Present Illness    Chief complaint: Hand injury    Location: Right hand    Quality/Severity: Moderate, sharp    Timing/Onset/Duration: Last night    Modifying Factors: Hurts to move    Associated Symptoms: No numbness, tingling, or weakness.  No change in color or temperature.  No other complaints    Narrative: This 24-year-old did a back flip last night and landed on his right hand.    PCP:Nicholas Ding DO      Review of Systems   Musculoskeletal:         Right thumb pain   Neurological:  Positive for numbness.       Past Medical History:   Diagnosis Date    Acid reflux     Anxiety     Back problem        No Known Allergies    Past Surgical History:   Procedure Laterality Date    HERNIA REPAIR      PILONIDAL CYSTECTOMY N/A 9/26/2022    Procedure: Excision of pilonidal cyst;  Surgeon: Alexi Momin Jr., MD;  Location: [facility];  Service: General;  Laterality: N/A;       Family History   Problem Relation Age of Onset    Kidney disease Father     Hypertension Father     Heart disease Father     Diabetes Father     Alcohol abuse Father     Asthma Father     Cancer Father     Other Father     Crohn's disease Father     Other Sister     Cancer Paternal Grandmother     Colon cancer Neg Hx     Colon polyps Neg Hx     Malig Hyperthermia Neg Hx        Social History     Socioeconomic History    Marital status: Single   Tobacco Use    Smoking status: Light Smoker     Current packs/day: 0.25     Average packs/day: 0.3 packs/day for 10.5 years (2.6 ttl pk-yrs)     Types: Cigarettes     Start date: 2015     Passive exposure: Current    Smokeless tobacco: Never   Vaping Use    Vaping status: Former    Substances: Nicotine    Devices: Disposable, nicotine vape   Substance and Sexual Activity    Alcohol use: Defer     Comment: tried     Drug use: Yes     Types: Marijuana     Comment: 3 weeks ago    Sexual activity: Yes     Partners: Female     Birth control/protection: Condom           Objective    Physical Exam  Vitals (The temperature is 98.3 °F, pulse 78, respiration 16, /114, room air pulse ox 97%.) and nursing note reviewed.   Constitutional:       Appearance: Normal appearance.   Musculoskeletal:      Comments: There is tenderness upon palpation of the left hyperthenar eminence of the thumb.  The capillary refill is less than 2 seconds.  The sensation is intact.  There is a normal range of motion noted.  There is no joint laxity noted.  There is a 2+ radial pulse.  Right upper extremity is otherwise without tenderness or deformity neurovascular intact.   Skin:     General: Skin is warm and dry.      Findings: No bruising.   Neurological:      Mental Status: He is alert.      Sensory: No sensory deficit.      Motor: No weakness.         Procedures           ED Course      14:06 EDT, 07/20/25: The patient had a malleable splint applied to the right thumb by the technician.  After application of splint, Sipp assessed by me and noted be in good position with the right upper extremity being neurovascularly intact.    14:10 EDT, 07/20/25:  Patient's diagnosis of right thumb sprain was discussed with him.  Patient should ice the right thumb for 20 minutes every 2-3 hours while awake for 2 to 3 days.  Patient should take Motrin or Tylenol as needed as directed for pain.  The patient should call Kadi Lindsey on Monday for follow-up appointment next week.  The patient should return to the emergency department there is increased pain, numbness, tingling, weakness, change in color or temperature of the right upper extremity, worse in any way at all.                                                 Medical Decision Making  Amount and/or Complexity of Data Reviewed  Radiology: ordered.        Final diagnoses:   Other sprain of right thumb, initial encounter       ED Disposition  ED Disposition       None            No follow-up provider specified.       Medication List      No changes were made to your  prescriptions during this visit.       XR Hand 3+ View Right   ED Interpretation   The x-ray of the right hand as read by Dr. Ramirez is negative.      Final Result   Impression:   Negative right hand radiographs.            Electronically Signed: Fred Ramirez MD     7/20/2025 12:54 PM EDT     Workstation ID: [id number]        Labs Reviewed - No data to display  XR Hand 3+ View Right  Result Date: 7/20/2025  Narrative: XR HAND 3+ VW RIGHT Date of Exam: 7/20/2025 12:30 PM EDT Indication: fell on right hand and injured it Comparison: None available. Findings: No evidence of acute fracture. Normal joint alignment. No significant degenerative changes. Soft tissues are unremarkable.     Impression: Impression: Negative right hand radiographs. Electronically Signed: Fred Ramirez MD  7/20/2025 12:54 PM EDT  Workstation ID: [id number]      Final diagnoses:   Other sprain of right thumb, initial encounter         ED Medications:  Medications - No data to display    New Medications:     Medication List        ASK your doctor about these medications      traZODone 50 MG tablet  Commonly known as: DESYREL  Take 1 tablet by mouth Every Night.              Stopped Medications:     Medication List        ASK your doctor about these medications      traZODone 50 MG tablet  Commonly known as: DESYREL  Take 1 tablet by mouth Every Night.                   Arnaldo Hermosillo MD  07/20/25 7590

## 2025-07-21 ENCOUNTER — HOSPITAL ENCOUNTER (OUTPATIENT)
Facility: HOSPITAL | Age: 25
Setting detail: OBSERVATION
Discharge: HOME | End: 2025-07-22
Attending: STUDENT IN AN ORGANIZED HEALTH CARE EDUCATION/TRAINING PROGRAM | Admitting: HOSPITALIST
Payer: COMMERCIAL

## 2025-07-21 ENCOUNTER — APPOINTMENT (OUTPATIENT)
Dept: CT IMAGING | Facility: HOSPITAL | Age: 25
End: 2025-07-21
Payer: COMMERCIAL

## 2025-07-21 DIAGNOSIS — E86.0 DEHYDRATION: ICD-10-CM

## 2025-07-21 DIAGNOSIS — R10.13 EPIGASTRIC PAIN: ICD-10-CM

## 2025-07-21 DIAGNOSIS — E83.39 HYPOPHOSPHATEMIA: ICD-10-CM

## 2025-07-21 DIAGNOSIS — E87.29 ALCOHOLIC KETOACIDOSIS: ICD-10-CM

## 2025-07-21 DIAGNOSIS — F10.930 ALCOHOL WITHDRAWAL SYNDROME WITHOUT COMPLICATION: ICD-10-CM

## 2025-07-21 DIAGNOSIS — F10.90 ALCOHOL USE DISORDER: ICD-10-CM

## 2025-07-21 DIAGNOSIS — K92.2 UGI BLEED: Primary | ICD-10-CM

## 2025-07-21 DIAGNOSIS — E87.29 INCREASED ANION GAP METABOLIC ACIDOSIS: ICD-10-CM

## 2025-07-21 PROBLEM — E87.8: Status: ACTIVE | Noted: 2025-07-21

## 2025-07-21 LAB
ACETONE BLD QL: ABNORMAL
ALBUMIN SERPL-MCNC: 5.1 G/DL (ref 3.5–5.2)
ALBUMIN/GLOB SERPL: 1.8 G/DL
ALP SERPL-CCNC: 69 U/L (ref 39–117)
ALT SERPL W P-5'-P-CCNC: 75 U/L (ref 1–41)
AMPHET+METHAMPHET UR QL: NEGATIVE
AMPHETAMINES UR QL: NEGATIVE
ANION GAP SERPL CALCULATED.3IONS-SCNC: 26.3 MMOL/L (ref 5–15)
AST SERPL-CCNC: 47 U/L (ref 1–40)
BARBITURATES UR QL SCN: NEGATIVE
BASOPHILS # BLD AUTO: 0.05 10*3/MM3 (ref 0–0.2)
BASOPHILS NFR BLD AUTO: 0.4 % (ref 0–1.5)
BENZODIAZ UR QL SCN: POSITIVE
BILIRUB SERPL-MCNC: 2.1 MG/DL (ref 0–1.2)
BILIRUB UR QL STRIP: NEGATIVE
BUN SERPL-MCNC: 14 MG/DL (ref 6–20)
BUN/CREAT SERPL: 13.1 (ref 7–25)
BUPRENORPHINE SERPL-MCNC: NEGATIVE NG/ML
CALCIUM SPEC-SCNC: 9.8 MG/DL (ref 8.6–10.5)
CANNABINOIDS SERPL QL: NEGATIVE
CHLORIDE SERPL-SCNC: 91 MMOL/L (ref 98–107)
CLARITY UR: CLEAR
CO2 SERPL-SCNC: 17.7 MMOL/L (ref 22–29)
COCAINE UR QL: NEGATIVE
COLOR UR: YELLOW
CREAT SERPL-MCNC: 1.07 MG/DL (ref 0.76–1.27)
D-LACTATE SERPL-SCNC: 1.1 MMOL/L (ref 0.5–2)
D-LACTATE SERPL-SCNC: 4.6 MMOL/L (ref 0.5–2)
DEPRECATED RDW RBC AUTO: 39.6 FL (ref 37–54)
EGFRCR SERPLBLD CKD-EPI 2021: 100 ML/MIN/1.73
EOSINOPHIL # BLD AUTO: 0.01 10*3/MM3 (ref 0–0.4)
EOSINOPHIL NFR BLD AUTO: 0.1 % (ref 0.3–6.2)
ERYTHROCYTE [DISTWIDTH] IN BLOOD BY AUTOMATED COUNT: 11.4 % (ref 12.3–15.4)
ETHANOL BLD-MCNC: 40 MG/DL (ref 0–10)
ETHANOL UR QL: 0.04 %
GLOBULIN UR ELPH-MCNC: 2.9 GM/DL
GLUCOSE SERPL-MCNC: 74 MG/DL (ref 65–99)
GLUCOSE UR STRIP-MCNC: NEGATIVE MG/DL
HCT VFR BLD AUTO: 51.8 % (ref 37.5–51)
HGB BLD-MCNC: 19.4 G/DL (ref 13–17.7)
HGB UR QL STRIP.AUTO: NEGATIVE
IMM GRANULOCYTES # BLD AUTO: 0.06 10*3/MM3 (ref 0–0.05)
IMM GRANULOCYTES NFR BLD AUTO: 0.5 % (ref 0–0.5)
KETONES UR QL STRIP: ABNORMAL
LEUKOCYTE ESTERASE UR QL STRIP.AUTO: NEGATIVE
LIPASE SERPL-CCNC: 25 U/L (ref 13–60)
LYMPHOCYTES # BLD AUTO: 1.46 10*3/MM3 (ref 0.7–3.1)
LYMPHOCYTES NFR BLD AUTO: 12.6 % (ref 19.6–45.3)
MAGNESIUM SERPL-MCNC: 2.2 MG/DL (ref 1.6–2.6)
MCH RBC QN AUTO: 35.3 PG (ref 26.6–33)
MCHC RBC AUTO-ENTMCNC: 37.5 G/DL (ref 31.5–35.7)
MCV RBC AUTO: 94.4 FL (ref 79–97)
METHADONE UR QL SCN: NEGATIVE
MONOCYTES # BLD AUTO: 0.92 10*3/MM3 (ref 0.1–0.9)
MONOCYTES NFR BLD AUTO: 7.9 % (ref 5–12)
NEUTROPHILS NFR BLD AUTO: 78.5 % (ref 42.7–76)
NEUTROPHILS NFR BLD AUTO: 9.09 10*3/MM3 (ref 1.7–7)
NITRITE UR QL STRIP: NEGATIVE
NRBC BLD AUTO-RTO: 0 /100 WBC (ref 0–0.2)
OPIATES UR QL: POSITIVE
OXYCODONE UR QL SCN: NEGATIVE
PCP UR QL SCN: NEGATIVE
PH UR STRIP.AUTO: 6 [PH] (ref 4.5–8)
PHOSPHATE SERPL-MCNC: 2.2 MG/DL (ref 2.5–4.5)
PLATELET # BLD AUTO: 314 10*3/MM3 (ref 140–450)
PMV BLD AUTO: 9.6 FL (ref 6–12)
POTASSIUM SERPL-SCNC: 3.7 MMOL/L (ref 3.5–5.2)
PROT SERPL-MCNC: 8 G/DL (ref 6–8.5)
PROT UR QL STRIP: NEGATIVE
QT INTERVAL: 397 MS
QTC INTERVAL: 427 MS
RBC # BLD AUTO: 5.49 10*6/MM3 (ref 4.14–5.8)
SODIUM SERPL-SCNC: 135 MMOL/L (ref 136–145)
SP GR UR STRIP: 1.01 (ref 1–1.03)
TRICYCLICS UR QL SCN: NEGATIVE
UROBILINOGEN UR QL STRIP: ABNORMAL
WBC NRBC COR # BLD AUTO: 11.59 10*3/MM3 (ref 3.4–10.8)

## 2025-07-21 PROCEDURE — 25810000003 SODIUM CHLORIDE 0.9 % SOLUTION 1,000 ML FLEX CONT: Performed by: NURSE PRACTITIONER

## 2025-07-21 PROCEDURE — G0378 HOSPITAL OBSERVATION PER HR: HCPCS

## 2025-07-21 PROCEDURE — 83605 ASSAY OF LACTIC ACID: CPT | Performed by: STUDENT IN AN ORGANIZED HEALTH CARE EDUCATION/TRAINING PROGRAM

## 2025-07-21 PROCEDURE — 96367 TX/PROPH/DG ADDL SEQ IV INF: CPT

## 2025-07-21 PROCEDURE — 96365 THER/PROPH/DIAG IV INF INIT: CPT

## 2025-07-21 PROCEDURE — 82009 KETONE BODYS QUAL: CPT | Performed by: STUDENT IN AN ORGANIZED HEALTH CARE EDUCATION/TRAINING PROGRAM

## 2025-07-21 PROCEDURE — 99223 1ST HOSP IP/OBS HIGH 75: CPT | Performed by: NURSE PRACTITIONER

## 2025-07-21 PROCEDURE — 80053 COMPREHEN METABOLIC PANEL: CPT | Performed by: STUDENT IN AN ORGANIZED HEALTH CARE EDUCATION/TRAINING PROGRAM

## 2025-07-21 PROCEDURE — 96376 TX/PRO/DX INJ SAME DRUG ADON: CPT

## 2025-07-21 PROCEDURE — 74177 CT ABD & PELVIS W/CONTRAST: CPT

## 2025-07-21 PROCEDURE — 83690 ASSAY OF LIPASE: CPT | Performed by: STUDENT IN AN ORGANIZED HEALTH CARE EDUCATION/TRAINING PROGRAM

## 2025-07-21 PROCEDURE — 25010000002 ONDANSETRON PER 1 MG: Performed by: STUDENT IN AN ORGANIZED HEALTH CARE EDUCATION/TRAINING PROGRAM

## 2025-07-21 PROCEDURE — 93005 ELECTROCARDIOGRAM TRACING: CPT | Performed by: NURSE PRACTITIONER

## 2025-07-21 PROCEDURE — 25010000002 PHENOBARBITAL PER 120 MG: Performed by: NURSE PRACTITIONER

## 2025-07-21 PROCEDURE — 99285 EMERGENCY DEPT VISIT HI MDM: CPT | Performed by: STUDENT IN AN ORGANIZED HEALTH CARE EDUCATION/TRAINING PROGRAM

## 2025-07-21 PROCEDURE — 25010000002 THIAMINE PER 100 MG: Performed by: NURSE PRACTITIONER

## 2025-07-21 PROCEDURE — 96361 HYDRATE IV INFUSION ADD-ON: CPT

## 2025-07-21 PROCEDURE — 25010000002 FOLIC ACID 5 MG/ML SOLUTION 10 ML VIAL: Performed by: NURSE PRACTITIONER

## 2025-07-21 PROCEDURE — 84100 ASSAY OF PHOSPHORUS: CPT | Performed by: STUDENT IN AN ORGANIZED HEALTH CARE EDUCATION/TRAINING PROGRAM

## 2025-07-21 PROCEDURE — 93010 ELECTROCARDIOGRAM REPORT: CPT | Performed by: INTERNAL MEDICINE

## 2025-07-21 PROCEDURE — 25010000002 ONDANSETRON PER 1 MG: Performed by: NURSE PRACTITIONER

## 2025-07-21 PROCEDURE — 25010000002 MORPHINE PER 10 MG: Performed by: STUDENT IN AN ORGANIZED HEALTH CARE EDUCATION/TRAINING PROGRAM

## 2025-07-21 PROCEDURE — 96366 THER/PROPH/DIAG IV INF ADDON: CPT

## 2025-07-21 PROCEDURE — 25010000002 THIAMINE PER 100 MG: Performed by: STUDENT IN AN ORGANIZED HEALTH CARE EDUCATION/TRAINING PROGRAM

## 2025-07-21 PROCEDURE — 81003 URINALYSIS AUTO W/O SCOPE: CPT | Performed by: STUDENT IN AN ORGANIZED HEALTH CARE EDUCATION/TRAINING PROGRAM

## 2025-07-21 PROCEDURE — 25810000003 SODIUM CHLORIDE 0.9 % SOLUTION: Performed by: STUDENT IN AN ORGANIZED HEALTH CARE EDUCATION/TRAINING PROGRAM

## 2025-07-21 PROCEDURE — 25810000003 DEXTROSE 5 % AND SODIUM CHLORIDE 0.9 % 5-0.9 % SOLUTION: Performed by: STUDENT IN AN ORGANIZED HEALTH CARE EDUCATION/TRAINING PROGRAM

## 2025-07-21 PROCEDURE — 25510000001 IOPAMIDOL PER 1 ML: Performed by: STUDENT IN AN ORGANIZED HEALTH CARE EDUCATION/TRAINING PROGRAM

## 2025-07-21 PROCEDURE — 25010000002 MIDAZOLAM PER 1MG: Performed by: STUDENT IN AN ORGANIZED HEALTH CARE EDUCATION/TRAINING PROGRAM

## 2025-07-21 PROCEDURE — 87040 BLOOD CULTURE FOR BACTERIA: CPT | Performed by: STUDENT IN AN ORGANIZED HEALTH CARE EDUCATION/TRAINING PROGRAM

## 2025-07-21 PROCEDURE — 82077 ASSAY SPEC XCP UR&BREATH IA: CPT | Performed by: STUDENT IN AN ORGANIZED HEALTH CARE EDUCATION/TRAINING PROGRAM

## 2025-07-21 PROCEDURE — 96375 TX/PRO/DX INJ NEW DRUG ADDON: CPT

## 2025-07-21 PROCEDURE — 83735 ASSAY OF MAGNESIUM: CPT | Performed by: STUDENT IN AN ORGANIZED HEALTH CARE EDUCATION/TRAINING PROGRAM

## 2025-07-21 PROCEDURE — 25010000002 CEFTRIAXONE PER 250 MG: Performed by: STUDENT IN AN ORGANIZED HEALTH CARE EDUCATION/TRAINING PROGRAM

## 2025-07-21 PROCEDURE — 36415 COLL VENOUS BLD VENIPUNCTURE: CPT

## 2025-07-21 PROCEDURE — 80306 DRUG TEST PRSMV INSTRMNT: CPT | Performed by: STUDENT IN AN ORGANIZED HEALTH CARE EDUCATION/TRAINING PROGRAM

## 2025-07-21 PROCEDURE — 85025 COMPLETE CBC W/AUTO DIFF WBC: CPT | Performed by: STUDENT IN AN ORGANIZED HEALTH CARE EDUCATION/TRAINING PROGRAM

## 2025-07-21 RX ORDER — NITROGLYCERIN 0.4 MG/1
0.4 TABLET SUBLINGUAL
Status: DISCONTINUED | OUTPATIENT
Start: 2025-07-21 | End: 2025-07-22 | Stop reason: HOSPADM

## 2025-07-21 RX ORDER — IOPAMIDOL 755 MG/ML
100 INJECTION, SOLUTION INTRAVASCULAR
Status: COMPLETED | OUTPATIENT
Start: 2025-07-21 | End: 2025-07-21

## 2025-07-21 RX ORDER — MIDAZOLAM HYDROCHLORIDE 2 MG/2ML
4 INJECTION, SOLUTION INTRAMUSCULAR; INTRAVENOUS
Status: DISCONTINUED | OUTPATIENT
Start: 2025-07-21 | End: 2025-07-22

## 2025-07-21 RX ORDER — AMOXICILLIN 250 MG
2 CAPSULE ORAL 2 TIMES DAILY PRN
Status: DISCONTINUED | OUTPATIENT
Start: 2025-07-21 | End: 2025-07-22 | Stop reason: HOSPADM

## 2025-07-21 RX ORDER — BISACODYL 5 MG/1
5 TABLET, DELAYED RELEASE ORAL DAILY PRN
Status: DISCONTINUED | OUTPATIENT
Start: 2025-07-21 | End: 2025-07-22 | Stop reason: HOSPADM

## 2025-07-21 RX ORDER — LORAZEPAM 1 MG/1
1 TABLET ORAL EVERY 6 HOURS
Status: DISCONTINUED | OUTPATIENT
Start: 2025-07-22 | End: 2025-07-22

## 2025-07-21 RX ORDER — BISACODYL 10 MG
10 SUPPOSITORY, RECTAL RECTAL DAILY PRN
Status: DISCONTINUED | OUTPATIENT
Start: 2025-07-21 | End: 2025-07-22 | Stop reason: HOSPADM

## 2025-07-21 RX ORDER — DEXTROSE MONOHYDRATE AND SODIUM CHLORIDE 5; .45 G/100ML; G/100ML
125 INJECTION, SOLUTION INTRAVENOUS CONTINUOUS
Status: DISCONTINUED | OUTPATIENT
Start: 2025-07-21 | End: 2025-07-22 | Stop reason: HOSPADM

## 2025-07-21 RX ORDER — PANTOPRAZOLE SODIUM 40 MG/10ML
40 INJECTION, POWDER, LYOPHILIZED, FOR SOLUTION INTRAVENOUS ONCE
Status: COMPLETED | OUTPATIENT
Start: 2025-07-21 | End: 2025-07-21

## 2025-07-21 RX ORDER — ONDANSETRON 2 MG/ML
4 INJECTION INTRAMUSCULAR; INTRAVENOUS EVERY 6 HOURS PRN
Status: DISCONTINUED | OUTPATIENT
Start: 2025-07-21 | End: 2025-07-22 | Stop reason: HOSPADM

## 2025-07-21 RX ORDER — PHENOBARBITAL SODIUM 65 MG/ML
65 INJECTION, SOLUTION INTRAMUSCULAR; INTRAVENOUS ONCE
Status: DISCONTINUED | OUTPATIENT
Start: 2025-07-22 | End: 2025-07-22

## 2025-07-21 RX ORDER — SODIUM CHLORIDE 9 MG/ML
INJECTION, SOLUTION INTRAVENOUS
Status: ACTIVE
Start: 2025-07-21 | End: 2025-07-21

## 2025-07-21 RX ORDER — ONDANSETRON 2 MG/ML
4 INJECTION INTRAMUSCULAR; INTRAVENOUS ONCE
Status: COMPLETED | OUTPATIENT
Start: 2025-07-21 | End: 2025-07-21

## 2025-07-21 RX ORDER — PHENOBARBITAL 32.4 MG/1
32.4 TABLET ORAL ONCE
Status: DISCONTINUED | OUTPATIENT
Start: 2025-07-24 | End: 2025-07-22

## 2025-07-21 RX ORDER — PHENOBARBITAL 32.4 MG/1
32.4 TABLET ORAL ONCE
Status: DISCONTINUED | OUTPATIENT
Start: 2025-07-23 | End: 2025-07-22

## 2025-07-21 RX ORDER — PANTOPRAZOLE SODIUM 40 MG/1
40 TABLET, DELAYED RELEASE ORAL
Status: DISCONTINUED | OUTPATIENT
Start: 2025-07-22 | End: 2025-07-22 | Stop reason: HOSPADM

## 2025-07-21 RX ORDER — LORAZEPAM 1 MG/1
1 TABLET ORAL EVERY 12 HOURS SCHEDULED
Status: DISCONTINUED | OUTPATIENT
Start: 2025-07-23 | End: 2025-07-22

## 2025-07-21 RX ORDER — SODIUM CHLORIDE 0.9 % (FLUSH) 0.9 %
10 SYRINGE (ML) INJECTION EVERY 12 HOURS SCHEDULED
Status: DISCONTINUED | OUTPATIENT
Start: 2025-07-21 | End: 2025-07-22 | Stop reason: HOSPADM

## 2025-07-21 RX ORDER — NICOTINE 21 MG/24HR
1 PATCH, TRANSDERMAL 24 HOURS TRANSDERMAL
Status: DISCONTINUED | OUTPATIENT
Start: 2025-07-21 | End: 2025-07-21

## 2025-07-21 RX ORDER — LORAZEPAM 1 MG/1
2 TABLET ORAL EVERY 6 HOURS
Status: COMPLETED | OUTPATIENT
Start: 2025-07-21 | End: 2025-07-22

## 2025-07-21 RX ORDER — ONDANSETRON 4 MG/1
4 TABLET, ORALLY DISINTEGRATING ORAL EVERY 6 HOURS PRN
Status: DISCONTINUED | OUTPATIENT
Start: 2025-07-21 | End: 2025-07-22 | Stop reason: HOSPADM

## 2025-07-21 RX ORDER — LORAZEPAM 1 MG/1
TABLET ORAL
Status: COMPLETED
Start: 2025-07-21 | End: 2025-07-21

## 2025-07-21 RX ORDER — MIDAZOLAM HYDROCHLORIDE 2 MG/2ML
1 INJECTION, SOLUTION INTRAMUSCULAR; INTRAVENOUS ONCE
Status: COMPLETED | OUTPATIENT
Start: 2025-07-21 | End: 2025-07-21

## 2025-07-21 RX ORDER — PHENOBARBITAL SODIUM 65 MG/ML
INJECTION, SOLUTION INTRAMUSCULAR; INTRAVENOUS
Status: ACTIVE
Start: 2025-07-21 | End: 2025-07-21

## 2025-07-21 RX ORDER — DEXTROSE MONOHYDRATE AND SODIUM CHLORIDE 5; .9 G/100ML; G/100ML
500 INJECTION, SOLUTION INTRAVENOUS ONCE
Status: COMPLETED | OUTPATIENT
Start: 2025-07-21 | End: 2025-07-21

## 2025-07-21 RX ORDER — LIDOCAINE HYDROCHLORIDE 20 MG/ML
5 SOLUTION OROPHARYNGEAL ONCE
Status: COMPLETED | OUTPATIENT
Start: 2025-07-21 | End: 2025-07-21

## 2025-07-21 RX ORDER — LORAZEPAM 1 MG/1
1 TABLET ORAL DAILY
Status: DISCONTINUED | OUTPATIENT
Start: 2025-07-24 | End: 2025-07-22

## 2025-07-21 RX ORDER — ALUMINA, MAGNESIA, AND SIMETHICONE 2400; 2400; 240 MG/30ML; MG/30ML; MG/30ML
30 SUSPENSION ORAL ONCE
Status: COMPLETED | OUTPATIENT
Start: 2025-07-21 | End: 2025-07-21

## 2025-07-21 RX ORDER — PANTOPRAZOLE SODIUM 40 MG/10ML
40 INJECTION, POWDER, LYOPHILIZED, FOR SOLUTION INTRAVENOUS
Status: DISCONTINUED | OUTPATIENT
Start: 2025-07-22 | End: 2025-07-21

## 2025-07-21 RX ORDER — SODIUM CHLORIDE 0.9 % (FLUSH) 0.9 %
10 SYRINGE (ML) INJECTION AS NEEDED
Status: DISCONTINUED | OUTPATIENT
Start: 2025-07-21 | End: 2025-07-22 | Stop reason: HOSPADM

## 2025-07-21 RX ORDER — POLYETHYLENE GLYCOL 3350 17 G/17G
17 POWDER, FOR SOLUTION ORAL DAILY PRN
Status: DISCONTINUED | OUTPATIENT
Start: 2025-07-21 | End: 2025-07-22 | Stop reason: HOSPADM

## 2025-07-21 RX ORDER — LORAZEPAM 1 MG/1
2 TABLET ORAL
Status: DISCONTINUED | OUTPATIENT
Start: 2025-07-21 | End: 2025-07-22

## 2025-07-21 RX ORDER — PHENOBARBITAL SODIUM 65 MG/ML
65 INJECTION, SOLUTION INTRAMUSCULAR; INTRAVENOUS ONCE
Status: COMPLETED | OUTPATIENT
Start: 2025-07-22 | End: 2025-07-22

## 2025-07-21 RX ORDER — MIDAZOLAM HYDROCHLORIDE 2 MG/2ML
2 INJECTION, SOLUTION INTRAMUSCULAR; INTRAVENOUS
Status: DISCONTINUED | OUTPATIENT
Start: 2025-07-21 | End: 2025-07-22

## 2025-07-21 RX ORDER — THIAMINE HYDROCHLORIDE 100 MG/ML
100 INJECTION, SOLUTION INTRAMUSCULAR; INTRAVENOUS ONCE
Status: COMPLETED | OUTPATIENT
Start: 2025-07-21 | End: 2025-07-21

## 2025-07-21 RX ORDER — LORAZEPAM 1 MG/1
1 TABLET ORAL
Status: DISCONTINUED | OUTPATIENT
Start: 2025-07-21 | End: 2025-07-22

## 2025-07-21 RX ORDER — NICOTINE 21 MG/24HR
1 PATCH, TRANSDERMAL 24 HOURS TRANSDERMAL
Status: DISCONTINUED | OUTPATIENT
Start: 2025-07-21 | End: 2025-07-22 | Stop reason: HOSPADM

## 2025-07-21 RX ORDER — SODIUM CHLORIDE 9 MG/ML
40 INJECTION, SOLUTION INTRAVENOUS AS NEEDED
Status: DISCONTINUED | OUTPATIENT
Start: 2025-07-21 | End: 2025-07-22 | Stop reason: HOSPADM

## 2025-07-21 RX ADMIN — DEXTROSE AND SODIUM CHLORIDE 125 ML/HR: 5; 450 INJECTION, SOLUTION INTRAVENOUS at 15:42

## 2025-07-21 RX ADMIN — LIDOCAINE HYDROCHLORIDE 5 ML: 20 SOLUTION ORAL at 05:29

## 2025-07-21 RX ADMIN — MIDAZOLAM HYDROCHLORIDE 1 MG: 1 INJECTION, SOLUTION INTRAMUSCULAR; INTRAVENOUS at 03:43

## 2025-07-21 RX ADMIN — DEXTROSE MONOHYDRATE AND SODIUM CHLORIDE 500 ML/HR: 5; .9 INJECTION, SOLUTION INTRAVENOUS at 04:36

## 2025-07-21 RX ADMIN — ONDANSETRON 4 MG: 2 INJECTION, SOLUTION INTRAMUSCULAR; INTRAVENOUS at 03:43

## 2025-07-21 RX ADMIN — Medication 10 ML: at 08:54

## 2025-07-21 RX ADMIN — SODIUM CHLORIDE 1000 ML: 9 INJECTION, SOLUTION INTRAVENOUS at 03:41

## 2025-07-21 RX ADMIN — IOPAMIDOL 100 ML: 755 INJECTION, SOLUTION INTRAVENOUS at 04:27

## 2025-07-21 RX ADMIN — PANTOPRAZOLE SODIUM 40 MG: 40 INJECTION, POWDER, FOR SOLUTION INTRAVENOUS at 03:43

## 2025-07-21 RX ADMIN — THIAMINE HYDROCHLORIDE 125 ML/HR: 100 INJECTION, SOLUTION INTRAMUSCULAR; INTRAVENOUS at 06:37

## 2025-07-21 RX ADMIN — MORPHINE SULFATE 4 MG: 4 INJECTION, SOLUTION INTRAMUSCULAR; INTRAVENOUS at 03:44

## 2025-07-21 RX ADMIN — LORAZEPAM 2 MG: 1 TABLET ORAL at 18:09

## 2025-07-21 RX ADMIN — ONDANSETRON 4 MG: 2 INJECTION INTRAMUSCULAR; INTRAVENOUS at 18:09

## 2025-07-21 RX ADMIN — LORAZEPAM 2 MG: 1 TABLET ORAL at 12:44

## 2025-07-21 RX ADMIN — Medication 2 PACKET: at 04:37

## 2025-07-21 RX ADMIN — LORAZEPAM 2 MG: 1 TABLET ORAL at 06:11

## 2025-07-21 RX ADMIN — SODIUM CHLORIDE 195 MG: 9 INJECTION, SOLUTION INTRAVENOUS at 08:54

## 2025-07-21 RX ADMIN — PHENOBARBITAL SODIUM 292.5 MG: 65 INJECTION INTRAMUSCULAR; INTRAVENOUS at 06:11

## 2025-07-21 RX ADMIN — SODIUM CHLORIDE 1000 MG: 9 INJECTION, SOLUTION INTRAVENOUS at 04:22

## 2025-07-21 RX ADMIN — Medication 5 MG: at 20:34

## 2025-07-21 RX ADMIN — ALUMINUM HYDROXIDE, MAGNESIUM HYDROXIDE, AND DIMETHICONE 30 ML: 400; 400; 40 SUSPENSION ORAL at 05:29

## 2025-07-21 RX ADMIN — SODIUM CHLORIDE 195 MG: 9 INJECTION, SOLUTION INTRAVENOUS at 12:44

## 2025-07-21 RX ADMIN — THIAMINE HYDROCHLORIDE 100 MG: 100 INJECTION, SOLUTION INTRAMUSCULAR; INTRAVENOUS at 04:37

## 2025-07-21 RX ADMIN — NICOTINE 1 PATCH: 14 PATCH TRANSDERMAL at 19:25

## 2025-07-21 NOTE — PLAN OF CARE
Goal Outcome Evaluation:  Plan of Care Reviewed With: patient        Progress: improving  Outcome Evaluation: Patient here for nausea/vomitting for last couple of days. Scheduled medication helping with withdrawl symptoms. Patient resting most of the day, will answer questions when prompted to talk. Patient still not wanting any alcohol abuse help, will continue to make sure he has information at discharge if needed. Advance to regular diet, states he is afraid he is going to vomit but not having nausea at this time.  Anticipate home when discharged.

## 2025-07-21 NOTE — CASE MANAGEMENT/SOCIAL WORK
Continued Stay Note   [facility]     Patient Name: Alvarado Woods  MRN: [id number]  Today's Date: 7/21/2025    Admit Date: 7/21/2025    Plan: Plan home with family   Discharge Plan       Row Name 07/21/25 1300       Plan    Plan Plan home with family    Patient/Family in Agreement with Plan yes    Plan Comments Spoke with patient at bedside. He keeps eyes closed and minimally answers questions. Face sheet verified. Patient lives in a home with his father and brother. He is independent of ADLs including driving. He has not used DME/HH/Rehab serivces previously.  He sees Dr Ding as PCP and uses  [facility] [place]. There are no issues obtaining medications. He will use [facility] for this admission. Patient denies interest in information r/t alcohol/substance abuse treatment or programs. CM # placed on white board, will continue to follow for dc needs.                   Discharge Codes    No documentation.                 Expected Discharge Date and Time       Expected Discharge Date Expected Discharge Time    Jul 22, 2025               Colt Altamirano RN

## 2025-07-21 NOTE — PLAN OF CARE
Goal Outcome Evaluation:  Plan of Care Reviewed With: patient        Progress: no change  Outcome Evaluation: Admitted from ER for withdrawal. Patient is alert and oriented. Tolerating room air and sips of clear liquids. No complaints of shortness of breath or difficulty breathing. Mother remains at patients bedside.

## 2025-07-21 NOTE — H&P
"[facility] HOSPITALIST     Nicholas Ding DO    CHIEF COMPLAINT: intractable n/v/abdominal pain    HISTORY OF PRESENT ILLNESS:  Patient is a 24-year-old male who presented to the emergency department secondary to 2 days of intractable nausea and vomiting with some hematemesis, epigastric pain.  He admits to drinking a pint of whiskey daily for the past 3 years, last drink 6 pm today.  He notes that he has never gone to rehab and refuses to go at this time.  He declines to talk to psychiatric provider.  He denies SI/HI.  He states he just wants to be put \"back on that same medicine\" to help with alcohol withdrawal.  He smokes over 1 pack of cigarettes per day and denies illicit drug use. He otherwise has no plan for abstinence. At time of my exam he is quite tremulous.    Significant findings in ER include trace acetone, phosphorus 2.2, WBC 11.59, hemoglobin 19.4, ethanol 40, CO2 17.7, ALT 75, AST 47, total bilirubin 2.1, lactate 4.6  CTAP shows small hiatal hernia with distal esophageal wall thickening, possible esophagitis, marked hepatic steatosis, see full report in epic    In the ER he was given morphine 4 mg IV, Rocephin 1 g, Versed 1 mg IV, Zofran 4 mg IV, Protonix 40 mg IV, 1 L saline bolus, D5  mL/h, Phos-Nak, thiamine and admission was requested    He has a history of GERD, anxiety, tobacco abuse, daily alcohol use, illicit drug use, insomnia    He otherwise currently denies f/c/headache/rhinorrhea/nasal congestion/lightheadedness/syncopal sensation/cough/soa/diarrhea/chest pain/recent illness/sick exposures/change in bowel or bladder habits/no weight change/bloody stools/change in medications or any other new concerns. Denies SI/HI.    Past Medical History:   Diagnosis Date    Acid reflux     Anxiety     Back problem      Past Surgical History:   Procedure Laterality Date    HERNIA REPAIR      PILONIDAL CYSTECTOMY N/A 9/26/2022    Procedure: Excision of pilonidal cyst;  Surgeon: " Alexi Momin Jr., MD;  Location: [facility] MAIN OR;  Service: General;  Laterality: N/A;     Family History   Problem Relation Age of Onset    Kidney disease Father     Hypertension Father     Heart disease Father     Diabetes Father     Alcohol abuse Father     Asthma Father     Cancer Father     Other Father     Crohn's disease Father     Other Sister     Cancer Paternal Grandmother     Colon cancer Neg Hx     Colon polyps Neg Hx     Malig Hyperthermia Neg Hx      Social History     Tobacco Use    Smoking status: Light Smoker     Current packs/day: 0.25     Average packs/day: 0.3 packs/day for 10.6 years (2.6 ttl pk-yrs)     Types: Cigarettes     Start date: 2015     Passive exposure: Current    Smokeless tobacco: Never   Vaping Use    Vaping status: Former    Substances: Nicotine    Devices: Disposable, nicotine vape   Substance Use Topics    Alcohol use: Defer     Comment: tried     Drug use: Yes     Types: Marijuana     Comment: 3 weeks ago     No medications prior to admission.     Allergies:  Patient has no known allergies.    Immunization History   Administered Date(s) Administered    COVID-19 (PFIZER) Purple Cap Monovalent 09/08/2021, 09/29/2021    DTaP, Unspecified 02/01/2002, 08/08/2002, 03/06/2003, 10/25/2006    HPV Quadrivalent 09/18/2012, 11/20/2012    Hep A, 2 Dose 09/18/2012, 01/31/2018    Hep B / HiB 02/01/2002, 08/08/2002    Hep B, Adolescent or Pediatric 2001    Hib (PRP-T) 03/06/2003    Hpv9 01/31/2018    IPV 02/01/2002, 08/08/2002, 03/06/2003, 10/25/2006    Influenza Seasonal Injectable 11/20/2012    MCV4 Unspecified 09/18/2012    MMR 03/06/2003, 10/25/2006    Meningococcal MCV4P (Menactra) 01/31/2018    PEDS-Pneumococcal Conjugate (PCV7) 08/08/2002    Tdap 06/26/2012, 11/08/2024    Varicella 03/06/2003, 06/26/2012    flucelvax quad pfs =>4 YRS 12/03/2019       REVIEW OF SYSTEMS:  Please see the above history of present illness for pertinent positives and negatives.  The remainder of the  "patient's systems have been reviewed and are negative.     Vital Signs  Temp:  [97.9 °F (36.6 °C)-98 °F (36.7 °C)] 97.9 °F (36.6 °C)  Heart Rate:  [] 107  Resp:  [18-20] 20  BP: (144-160)/() 160/104  Body mass index is 25.18 kg/m².    Flowsheet Rows      Flowsheet Row First Filed Value   Admission Height 177.8 cm (70\") Documented at 07/21/2025 0319   Admission Weight 79.4 kg (175 lb) Documented at 07/21/2025 0319             Physical Exam  Vitals reviewed.   Constitutional:       Appearance: He is ill-appearing.   HENT:      Head: Normocephalic and atraumatic.      Mouth/Throat:      Mouth: Mucous membranes are moist.   Eyes:      Extraocular Movements: Extraocular movements intact.      Pupils: Pupils are equal, round, and reactive to light.   Cardiovascular:      Rate and Rhythm: Tachycardia present. Rhythm irregular.   Pulmonary:      Effort: Pulmonary effort is normal. No respiratory distress.      Breath sounds: Normal breath sounds. No wheezing or rales.   Abdominal:      General: Abdomen is flat. Bowel sounds are normal. There is no distension.      Palpations: Abdomen is soft.      Tenderness: There is no abdominal tenderness. There is no guarding.   Musculoskeletal:         General: No swelling.   Skin:     General: Skin is warm and dry.      Capillary Refill: Capillary refill takes less than 2 seconds.      Findings: No erythema.   Neurological:      General: No focal deficit present.      Mental Status: He is alert and oriented to person, place, and time.   Psychiatric:      Comments: agitated       Emotional Behavior:    Judgment and Insight: Poor   Mental Status:  Alertness alert    Memory: Good   Mood and Affect:         Depression unable to determine               Anxiety unable to determine    Debilities:   Physical Weakness none obvious   Handicaps unknown   Disabilities unknown   Agitation moderate     Results Review:    I reviewed the patient's new clinical results.  Lab Results (most " recent)       Procedure Component Value Units Date/Time    Lactic Acid, Plasma [[id number]]  (Abnormal) Collected: 07/21/25 0407    Specimen: Blood Updated: 07/21/25 0432     Lactate 4.6 mmol/L     Acetone [[id number]]  (Abnormal) Collected: 07/21/25 0340    Specimen: Blood Updated: 07/21/25 0430     Acetone Trace    Blood Culture - Blood, Blood, Venous [[id number]] Collected: 07/21/25 0407    Specimen: Blood, Venous Updated: 07/21/25 0412    Comprehensive Metabolic Panel [[id number]]  (Abnormal) Collected: 07/21/25 0340    Specimen: Blood Updated: 07/21/25 0410     Glucose 74 mg/dL      BUN 14.0 mg/dL      Creatinine 1.07 mg/dL      Sodium 135 mmol/L      Potassium 3.7 mmol/L      Chloride 91 mmol/L      CO2 17.7 mmol/L      Calcium 9.8 mg/dL      Total Protein 8.0 g/dL      Albumin 5.1 g/dL      ALT (SGPT) 75 U/L      AST (SGOT) 47 U/L      Alkaline Phosphatase 69 U/L      Total Bilirubin 2.1 mg/dL      Globulin 2.9 gm/dL      A/G Ratio 1.8 g/dL      BUN/Creatinine Ratio 13.1     Anion Gap 26.3 mmol/L      eGFR 100.0 mL/min/1.73     Narrative:      GFR Categories in Chronic Kidney Disease (CKD)              GFR Category          GFR (mL/min/1.73)    Interpretation  G1                    90 or greater        Normal or high (1)  G2                    60-89                Mild decrease (1)  G3a                   45-59                Mild to moderate decrease  G3b                   30-44                Moderate to severe decrease  G4                    15-29                Severe decrease  G5                    14 or less           Kidney failure    (1)In the absence of evidence of kidney disease, neither GFR category G1 or G2 fulfill the criteria for CKD.    eGFR calculation 2021 CKD-EPI creatinine equation, which does not include race as a factor    Lipase [[id number]]  (Normal) Collected: 07/21/25 0340    Specimen: Blood Updated: 07/21/25 0410     Lipase 25 U/L     Ethanol [[id number]]  (Abnormal) Collected: 07/21/25  0340    Specimen: Blood Updated: 07/21/25 0410     Ethanol 40 mg/dL      Ethanol % 0.040 %     Narrative:      Not for legal purposes.    Magnesium [[id number]]  (Normal) Collected: 07/21/25 0340    Specimen: Blood Updated: 07/21/25 0410     Magnesium 2.2 mg/dL     Phosphorus [[id number]]  (Abnormal) Collected: 07/21/25 0340    Specimen: Blood Updated: 07/21/25 0410     Phosphorus 2.2 mg/dL     CBC & Differential [[id number]]  (Abnormal) Collected: 07/21/25 0340    Specimen: Blood Updated: 07/21/25 0355    Narrative:      The following orders were created for panel order CBC & Differential.  Procedure                               Abnormality         Status                     ---------                               -----------         ------                     CBC Auto Differential[[id number]]        Abnormal            Final result               Scan Slide[[id number]]                                                                    Please view results for these tests on the individual orders.    CBC Auto Differential [[id number]]  (Abnormal) Collected: 07/21/25 0340    Specimen: Blood Updated: 07/21/25 0355     WBC 11.59 10*3/mm3      RBC 5.49 10*6/mm3      Hemoglobin 19.4 g/dL      Hematocrit 51.8 %      MCV 94.4 fL      MCH 35.3 pg      MCHC 37.5 g/dL      RDW 11.4 %      RDW-SD 39.6 fl      MPV 9.6 fL      Platelets 314 10*3/mm3      Neutrophil % 78.5 %      Lymphocyte % 12.6 %      Monocyte % 7.9 %      Eosinophil % 0.1 %      Basophil % 0.4 %      Immature Grans % 0.5 %      Neutrophils, Absolute 9.09 10*3/mm3      Lymphocytes, Absolute 1.46 10*3/mm3      Monocytes, Absolute 0.92 10*3/mm3      Eosinophils, Absolute 0.01 10*3/mm3      Basophils, Absolute 0.05 10*3/mm3      Immature Grans, Absolute 0.06 10*3/mm3      nRBC 0.0 /100 WBC     Blood Culture - Blood, Blood, Venous [[id number]] Collected: 07/21/25 0340    Specimen: Blood, Venous Updated: 07/21/25 0353            Imaging Results (Most Recent)        Procedure Component Value Units Date/Time    CT Abdomen Pelvis With Contrast [[id number]] Collected: 07/21/25 0450     Updated: 07/21/25 0457    Narrative:      CT ABDOMEN PELVIS W CONTRAST    Date of Exam: 7/21/2025 4:14 AM EDT    Indication: abd paiin, epigastric pain, hx AUD, n/v.    Comparison: None available.    Technique: Axial CT images were obtained of the abdomen and pelvis following the uneventful intravenous administration of iodinated contrast. Sagittal and coronal reconstructions were performed.  Automated exposure control and iterative reconstruction   methods were used.        Findings:  Heart size is normal. There is a small hiatal hernia and there is distal esophageal wall thickening with prominent adjacent lymph nodes. This could relate to esophagitis. Lung bases are clear. Tiny fat-containing periumbilical hernia. No acute osseous   abnormality or significant degenerative change.    There is marked hepatic steatosis. The gallbladder, bile ducts, pancreas, mid and distal stomach, duodenum, spleen, adrenal glands, kidneys, ureters and prostate gland all appear normal. Normal appendix. Colon and small bowel appear normal. Vasculature   is normal. No ascites, pneumoperitoneum or lymphadenopathy.      Impression:      Impression:  1.Small hiatal hernia with distal esophageal wall thickening and prominent adjacent lymph nodes. This could relate to esophagitis.  2.Marked hepatic steatosis.                Electronically Signed: Fred Joshi MD    7/21/2025 4:54 AM EDT    Workstation ID: [id number]          reviewed    ECG/EMG Results (most recent)       None          N/A    Assessment & Plan   Acute alcohol withdrawal with intractable n/v  Acute alcoholic ketoacidosis:   Acute polycythemia/lactic acidosis secondary to above:   Hepatic steatosis/transaminitis/hyperbilirubinemia:   IV nausea medication  Aggressive IV hydration after rally bag  High dose CIWA protocol now  Monitor closely    Epigastric  "pain/esophagitis:  Small HH:   GERD:  Continue IV protonix    Tachycardia:  Occasional skipped beat on auscultation, check EKG    Tobacco abuse:  Offer nicotine patch    Illicit drug use: check UDS    Insomnia: will already be on CIWA protocol    Anxiety: nothing acute    Patient adamantly declines rehab/mental health assistance.  Denies SI/HI    I discussed the patient's findings and my recommendations with patient and mother with patient's permission.     Haleigh Reynaga, APRN  07/21/25  06:05 EDT    \"Dictated utilizing Dragon dictation\"    Note disclaimer: At [facility], we believe that sharing information builds trust and better relationships. You are receiving this note because you recently visited [facility]. It is possible you will see health information before a provider has talked with you about it. This kind of information can be easy to misunderstand. To help you fully understand what it means for your health, we urge you to discuss this note with your provider.        "

## 2025-07-21 NOTE — ED PROVIDER NOTES
Subjective   History of Present Illness  24 male hx AUD presents to ed w/ cc 2 days of intractable n/v, occasionally bright red bloody in nature, associated sx of epigastric abd pain, context of 8 shots of liquor daily. This has never happened before. Denies f/c, back pain, syncope, melena, hematochezia. ROS otherwise neg. Vitals are tachycardic, on exam pt is tremulous, appears uncomfortable, vomiting, bloody emesis (trace to mild in quantity of blood) ttp epigastrium, no RUQ TTP.  Review of Systems    Past Medical History:   Diagnosis Date    Acid reflux     Anxiety     Back problem        No Known Allergies    Past Surgical History:   Procedure Laterality Date    HERNIA REPAIR      PILONIDAL CYSTECTOMY N/A 9/26/2022    Procedure: Excision of pilonidal cyst;  Surgeon: Alexi Momin Jr., MD;  Location: [facility];  Service: General;  Laterality: N/A;       Family History   Problem Relation Age of Onset    Kidney disease Father     Hypertension Father     Heart disease Father     Diabetes Father     Alcohol abuse Father     Asthma Father     Cancer Father     Other Father     Crohn's disease Father     Other Sister     Cancer Paternal Grandmother     Colon cancer Neg Hx     Colon polyps Neg Hx     Malig Hyperthermia Neg Hx        Social History     Socioeconomic History    Marital status: Single   Tobacco Use    Smoking status: Light Smoker     Current packs/day: 0.25     Average packs/day: 0.3 packs/day for 10.6 years (2.6 ttl pk-yrs)     Types: Cigarettes     Start date: 2015     Passive exposure: Current    Smokeless tobacco: Never   Vaping Use    Vaping status: Former    Substances: Nicotine    Devices: Disposable, nicotine vape   Substance and Sexual Activity    Alcohol use: Defer     Comment: tried     Drug use: Yes     Types: Marijuana     Comment: 3 weeks ago    Sexual activity: Yes     Partners: Female     Birth control/protection: Condom           Objective   Physical Exam    Procedures           ED  Course                                                       Medical Decision Making  Problems Addressed:  Alcohol use disorder: complicated acute illness or injury  Epigastric pain: complicated acute illness or injury  UGI bleed: complicated acute illness or injury    Amount and/or Complexity of Data Reviewed  Labs: ordered.  Radiology: ordered.    Risk  Prescription drug management.      Concern for pancreatitis; UGI likely cale arreola tear given lack of melena and brigt red intermittent nature; doubt variceal bleed w/o skin findings suggestive for cirrhosis. Pt likely in alcohol withdrawal as well. Tx w/ protonix IV, ceftriaxone IV, versed 1 mg IV, morphine 4 mg IV; eval with abd labs, UA, ct abd/ pelv wit IV contrast.    4:13 AM severe metabolic acidosis on CMP; CO2 16; AGAP 26. With a normal glucose, and alcohol level 40, concern for alcoholic ketoacidosi vs starvation ketosis, as well as dehydration induced lactic acidosis.    Tx w/ 1L D5W NS IV, thiamine 100 mg IV.    5 am distal esophageal thickening consistent w/ esophagitis. Likely source of bleeding as well.    Tx gi bleed w/ protonix IV    Tx acidosis w/ D5W NS IV, 1 L NS IV    Tx alcohol withdrawal with midazolam 1mg iv    Tx pain w/ morphine IV    Admit to Dr. Ocasio, hospitalist, who accepted s/o via [facility].    Hst pt  Dsp ADMIT  Final diagnoses:   UGI bleed   Epigastric pain   Increased anion gap metabolic acidosis   Alcoholic ketoacidosis   Dehydration   Alcohol use disorder   Hypophosphatemia   Alcohol withdrawal syndrome without complication       ED Disposition  ED Disposition       ED Disposition   Decision to Admit    Condition   --    Comment   Level of Care: Med/Surg [1]   Diagnosis: Disturbance of acid-base balance [636442]   Admitting Physician: YESI OCASIO [[id number]]   Attending Physician: YESI OCASIO [[id number]]                 No follow-up provider specified.       Medication List      No changes were made to your prescriptions  during this visit.            Harsh Webber MD  07/21/25 1892

## 2025-07-21 NOTE — ED NOTES
"Nursing report ED to floor  Alvarado Woods  24 y.o.  male    HPI :  HPI  Stated Reason for Visit: Saturday 10pm symtpom onset.  Endorses 6-7 bouts of emesis  History Obtained From: patient, family    Chief Complaint  Chief Complaint   Patient presents with    Vomiting    Nausea    Abdominal Pain       Admitting doctor:   Efrain Richardson MD    Admitting diagnosis:   The primary encounter diagnosis was UGI bleed. Diagnoses of Epigastric pain, Increased anion gap metabolic acidosis, Alcoholic ketoacidosis, Dehydration, Alcohol use disorder, Hypophosphatemia, and Alcohol withdrawal syndrome without complication were also pertinent to this visit.    Code status:   Current Code Status       Date Active Code Status Order ID Comments User Context       7/21/2025 0513 CPR (Attempt to Resuscitate) [id number]  Haleigh Reynaga, [facility]        Question Answer    Code Status (Patient has no pulse and is not breathing) CPR (Attempt to Resuscitate)    Medical Interventions (Patient has pulse or is breathing) Full Support                    Allergies:   Patient has no known allergies.    Isolation:   No active isolations    Intake and Output    Intake/Output Summary (Last 24 hours) at 7/21/2025 0523  Last data filed at 7/21/2025 0519  Gross per 24 hour   Intake 100 ml   Output --   Net 100 ml       Weight:       07/21/25 0319   Weight: 79.4 kg (175 lb)       Most recent vitals:   Vitals:    07/21/25 0319   BP: 146/93   Pulse: 105   Resp: 18   Temp: 98 °F (36.7 °C)   TempSrc: Oral   SpO2: 97%   Weight: 79.4 kg (175 lb)   Height: 177.8 cm (70\")       Active LDAs/IV Access:   Lines, Drains & Airways       Active LDAs       Name Placement date Placement time Site Days    Peripheral IV 07/21/25 0337 20 G Left Antecubital 07/21/25 0337  Antecubital  less than 1                    Labs (abnormal labs have a star):   Labs Reviewed   COMPREHENSIVE METABOLIC PANEL - Abnormal; Notable for the following components:       Result Value "    Sodium 135 (*)     Chloride 91 (*)     CO2 17.7 (*)     ALT (SGPT) 75 (*)     AST (SGOT) 47 (*)     Total Bilirubin 2.1 (*)     Anion Gap 26.3 (*)     All other components within normal limits    Narrative:     GFR Categories in Chronic Kidney Disease (CKD)              GFR Category          GFR (mL/min/1.73)    Interpretation  G1                    90 or greater        Normal or high (1)  G2                    60-89                Mild decrease (1)  G3a                   45-59                Mild to moderate decrease  G3b                   30-44                Moderate to severe decrease  G4                    15-29                Severe decrease  G5                    14 or less           Kidney failure    (1)In the absence of evidence of kidney disease, neither GFR category G1 or G2 fulfill the criteria for CKD.    eGFR calculation 2021 CKD-EPI creatinine equation, which does not include race as a factor   ETHANOL - Abnormal; Notable for the following components:    Ethanol 40 (*)     All other components within normal limits    Narrative:     Not for legal purposes.   LACTIC ACID, PLASMA - Abnormal; Notable for the following components:    Lactate 4.6 (*)     All other components within normal limits   CBC WITH AUTO DIFFERENTIAL - Abnormal; Notable for the following components:    WBC 11.59 (*)     Hemoglobin 19.4 (*)     Hematocrit 51.8 (*)     MCH 35.3 (*)     MCHC 37.5 (*)     RDW 11.4 (*)     Neutrophil % 78.5 (*)     Lymphocyte % 12.6 (*)     Eosinophil % 0.1 (*)     Neutrophils, Absolute 9.09 (*)     Monocytes, Absolute 0.92 (*)     Immature Grans, Absolute 0.06 (*)     All other components within normal limits   PHOSPHORUS - Abnormal; Notable for the following components:    Phosphorus 2.2 (*)     All other components within normal limits   ACETONE - Abnormal; Notable for the following components:    Acetone Trace (*)     All other components within normal limits   LIPASE - Normal   MAGNESIUM - Normal    BLOOD CULTURE   BLOOD CULTURE   URINE DRUG SCREEN   URINALYSIS W/ CULTURE IF INDICATED   LACTIC ACID, REFLEX   CBC AND DIFFERENTIAL    Narrative:     The following orders were created for panel order CBC & Differential.  Procedure                               Abnormality         Status                     ---------                               -----------         ------                     CBC Auto Differential[[id number]]        Abnormal            Final result               Scan Slide[[id number]]                                                                    Please view results for these tests on the individual orders.       EKG:   No orders to display       Meds given in ED:   Medications   aluminum-magnesium hydroxide-simethicone (MAALOX MAX) 400-400-40 MG/5ML suspension 30 mL (has no administration in time range)   Lidocaine Viscous HCl (XYLOCAINE) 2 % solution 5 mL (has no administration in time range)   Midazolam HCl (PF) (VERSED) injection 1 mg (1 mg Intravenous Given 7/21/25 0343)   morphine injection 4 mg (4 mg Intravenous Given 7/21/25 0344)   ondansetron (ZOFRAN) injection 4 mg (4 mg Intravenous Given 7/21/25 0343)   pantoprazole (PROTONIX) injection 40 mg (40 mg Intravenous Given 7/21/25 0343)   cefTRIAXone (ROCEPHIN) 1,000 mg in sodium chloride 0.9 % 100 mL IVPB-VTB (0 mg Intravenous Stopped 7/21/25 0519)   sodium chloride 0.9 % bolus 1,000 mL (1,000 mL Intravenous New Bag 7/21/25 0341)   dextrose 5 % and sodium chloride 0.9 % infusion (500 mL/hr Intravenous New Bag 7/21/25 0436)   thiamine (B-1) injection 100 mg (100 mg Intravenous Given 7/21/25 0437)   potassium & sodium phosphates (PHOS-NAK) 280-160-250 MG packet 2 packet (2 packets Oral Given 7/21/25 0437)   iopamidol (ISOVUE-370) 76 % injection 100 mL (100 mL Intravenous Given 7/21/25 0427)       Imaging results:  CT Abdomen Pelvis With Contrast  Result Date: 7/21/2025  Impression: 1.Small hiatal hernia with distal esophageal wall thickening  and prominent adjacent lymph nodes. This could relate to esophagitis. 2.Marked hepatic steatosis. Electronically Signed: Fred Joshi MD  7/21/2025 4:54 AM EDT  Workstation ID: [id number]    XR Hand 3+ View Right  Result Date: 7/20/2025  Impression: Negative right hand radiographs. Electronically Signed: Fred Ramirez MD  7/20/2025 12:54 PM EDT  Workstation ID: [id number]      Ambulatory status:   - Indpendent    Social issues:   Social History     Socioeconomic History    Marital status: Single   Tobacco Use    Smoking status: Light Smoker     Current packs/day: 0.25     Average packs/day: 0.3 packs/day for 10.6 years (2.6 ttl pk-yrs)     Types: Cigarettes     Start date: 2015     Passive exposure: Current    Smokeless tobacco: Never   Vaping Use    Vaping status: Former    Substances: Nicotine    Devices: Disposable, nicotine vape   Substance and Sexual Activity    Alcohol use: Defer     Comment: tried     Drug use: Yes     Types: Marijuana     Comment: 3 weeks ago    Sexual activity: Yes     Partners: Female     Birth control/protection: Condom       Peripheral Neurovascular  Peripheral Neurovascular (Adult)  Peripheral Neurovascular WDL: WDL    Neuro Cognitive  Neuro Cognitive (Adult)  Cognitive/Neuro/Behavioral WDL: WDL    Learning  Learning Assessment  Learning Readiness and Ability: no barriers identified    Respiratory  Respiratory  Airway WDL: WDL  Respiratory WDL  Respiratory WDL: WDL    Abdominal Pain       Pain Assessments  Pain (Adult)  (0-10) Pain Rating: Rest: 7  (0-10) Pain Rating: Activity: 7  Pain Location: abdomen  Pain Side/Orientation: generalized  Pain Description: constant    NIH Stroke Scale       Efra Meadows RN  07/21/25 05:23 EDT

## 2025-07-22 ENCOUNTER — READMISSION MANAGEMENT (OUTPATIENT)
Dept: CALL CENTER | Facility: HOSPITAL | Age: 25
End: 2025-07-22
Payer: COMMERCIAL

## 2025-07-22 VITALS
HEIGHT: 70 IN | BODY MASS INDEX: 25.12 KG/M2 | DIASTOLIC BLOOD PRESSURE: 84 MMHG | SYSTOLIC BLOOD PRESSURE: 146 MMHG | RESPIRATION RATE: 18 BRPM | HEART RATE: 100 BPM | OXYGEN SATURATION: 96 % | WEIGHT: 175.49 LBS | TEMPERATURE: 98.2 F

## 2025-07-22 LAB
ALBUMIN SERPL-MCNC: 3.8 G/DL (ref 3.5–5.2)
ALBUMIN/GLOB SERPL: 1.8 G/DL
ALP SERPL-CCNC: 48 U/L (ref 39–117)
ALT SERPL W P-5'-P-CCNC: 40 U/L (ref 1–41)
ANION GAP SERPL CALCULATED.3IONS-SCNC: 11.2 MMOL/L (ref 5–15)
AST SERPL-CCNC: 25 U/L (ref 1–40)
BASOPHILS # BLD AUTO: 0.02 10*3/MM3 (ref 0–0.2)
BASOPHILS NFR BLD AUTO: 0.3 % (ref 0–1.5)
BILIRUB SERPL-MCNC: 1.1 MG/DL (ref 0–1.2)
BUN SERPL-MCNC: 6.6 MG/DL (ref 6–20)
BUN/CREAT SERPL: 7.3 (ref 7–25)
CALCIUM SPEC-SCNC: 9 MG/DL (ref 8.6–10.5)
CHLORIDE SERPL-SCNC: 99 MMOL/L (ref 98–107)
CO2 SERPL-SCNC: 23.8 MMOL/L (ref 22–29)
CREAT SERPL-MCNC: 0.9 MG/DL (ref 0.76–1.27)
DEPRECATED RDW RBC AUTO: 40.7 FL (ref 37–54)
EGFRCR SERPLBLD CKD-EPI 2021: 123.1 ML/MIN/1.73
EOSINOPHIL # BLD AUTO: 0.02 10*3/MM3 (ref 0–0.4)
EOSINOPHIL NFR BLD AUTO: 0.3 % (ref 0.3–6.2)
ERYTHROCYTE [DISTWIDTH] IN BLOOD BY AUTOMATED COUNT: 11.3 % (ref 12.3–15.4)
GLOBULIN UR ELPH-MCNC: 2.1 GM/DL
GLUCOSE SERPL-MCNC: 107 MG/DL (ref 65–99)
HCT VFR BLD AUTO: 43.4 % (ref 37.5–51)
HGB BLD-MCNC: 15.6 G/DL (ref 13–17.7)
IMM GRANULOCYTES # BLD AUTO: 0.02 10*3/MM3 (ref 0–0.05)
IMM GRANULOCYTES NFR BLD AUTO: 0.3 % (ref 0–0.5)
LYMPHOCYTES # BLD AUTO: 1.16 10*3/MM3 (ref 0.7–3.1)
LYMPHOCYTES NFR BLD AUTO: 17.7 % (ref 19.6–45.3)
MCH RBC QN AUTO: 35.3 PG (ref 26.6–33)
MCHC RBC AUTO-ENTMCNC: 35.9 G/DL (ref 31.5–35.7)
MCV RBC AUTO: 98.2 FL (ref 79–97)
MONOCYTES # BLD AUTO: 0.75 10*3/MM3 (ref 0.1–0.9)
MONOCYTES NFR BLD AUTO: 11.4 % (ref 5–12)
NEUTROPHILS NFR BLD AUTO: 4.59 10*3/MM3 (ref 1.7–7)
NEUTROPHILS NFR BLD AUTO: 70 % (ref 42.7–76)
NRBC BLD AUTO-RTO: 0 /100 WBC (ref 0–0.2)
PLATELET # BLD AUTO: 175 10*3/MM3 (ref 140–450)
PMV BLD AUTO: 9.7 FL (ref 6–12)
POTASSIUM SERPL-SCNC: 3.7 MMOL/L (ref 3.5–5.2)
PROT SERPL-MCNC: 5.9 G/DL (ref 6–8.5)
RBC # BLD AUTO: 4.42 10*6/MM3 (ref 4.14–5.8)
SODIUM SERPL-SCNC: 134 MMOL/L (ref 136–145)
WBC NRBC COR # BLD AUTO: 6.56 10*3/MM3 (ref 3.4–10.8)

## 2025-07-22 PROCEDURE — G0378 HOSPITAL OBSERVATION PER HR: HCPCS

## 2025-07-22 PROCEDURE — 96376 TX/PRO/DX INJ SAME DRUG ADON: CPT

## 2025-07-22 PROCEDURE — 99238 HOSP IP/OBS DSCHRG MGMT 30/<: CPT | Performed by: HOSPITALIST

## 2025-07-22 PROCEDURE — 25010000002 PHENOBARBITAL PER 120 MG: Performed by: NURSE PRACTITIONER

## 2025-07-22 PROCEDURE — 85025 COMPLETE CBC W/AUTO DIFF WBC: CPT | Performed by: NURSE PRACTITIONER

## 2025-07-22 PROCEDURE — 94761 N-INVAS EAR/PLS OXIMETRY MLT: CPT

## 2025-07-22 PROCEDURE — 80053 COMPREHEN METABOLIC PANEL: CPT | Performed by: NURSE PRACTITIONER

## 2025-07-22 PROCEDURE — 96361 HYDRATE IV INFUSION ADD-ON: CPT

## 2025-07-22 RX ORDER — CHLORDIAZEPOXIDE HYDROCHLORIDE 5 MG/1
10 CAPSULE, GELATIN COATED ORAL 2 TIMES DAILY
Status: DISCONTINUED | OUTPATIENT
Start: 2025-07-24 | End: 2025-07-22 | Stop reason: HOSPADM

## 2025-07-22 RX ORDER — CHLORDIAZEPOXIDE HYDROCHLORIDE 25 MG/1
25 CAPSULE, GELATIN COATED ORAL 2 TIMES DAILY
Status: DISCONTINUED | OUTPATIENT
Start: 2025-07-23 | End: 2025-07-22 | Stop reason: HOSPADM

## 2025-07-22 RX ORDER — CHLORDIAZEPOXIDE HYDROCHLORIDE 5 MG/1
10 CAPSULE, GELATIN COATED ORAL ONCE
Status: DISCONTINUED | OUTPATIENT
Start: 2025-07-25 | End: 2025-07-22 | Stop reason: HOSPADM

## 2025-07-22 RX ORDER — CHLORDIAZEPOXIDE HYDROCHLORIDE 25 MG/1
CAPSULE, GELATIN COATED ORAL
Qty: 20 CAPSULE | Refills: 0 | Status: SHIPPED | OUTPATIENT
Start: 2025-07-22 | End: 2025-07-26

## 2025-07-22 RX ORDER — CHLORDIAZEPOXIDE HYDROCHLORIDE 25 MG/1
25 CAPSULE, GELATIN COATED ORAL EVERY 8 HOURS SCHEDULED
Status: DISCONTINUED | OUTPATIENT
Start: 2025-07-22 | End: 2025-07-22 | Stop reason: HOSPADM

## 2025-07-22 RX ORDER — CHLORDIAZEPOXIDE HYDROCHLORIDE 25 MG/1
50 CAPSULE, GELATIN COATED ORAL 3 TIMES DAILY PRN
Status: DISCONTINUED | OUTPATIENT
Start: 2025-07-22 | End: 2025-07-22 | Stop reason: HOSPADM

## 2025-07-22 RX ORDER — PANTOPRAZOLE SODIUM 40 MG/1
40 TABLET, DELAYED RELEASE ORAL
Qty: 30 TABLET | Refills: 0 | Status: SHIPPED | OUTPATIENT
Start: 2025-07-23

## 2025-07-22 RX ADMIN — Medication 10 ML: at 08:34

## 2025-07-22 RX ADMIN — CHLORDIAZEPOXIDE HYDROCHLORIDE 25 MG: 25 CAPSULE ORAL at 12:02

## 2025-07-22 RX ADMIN — NICOTINE 1 PATCH: 14 PATCH TRANSDERMAL at 08:38

## 2025-07-22 RX ADMIN — CHLORDIAZEPOXIDE HYDROCHLORIDE 50 MG: 25 CAPSULE ORAL at 11:07

## 2025-07-22 RX ADMIN — LORAZEPAM 2 MG: 1 TABLET ORAL at 00:27

## 2025-07-22 RX ADMIN — DEXTROSE AND SODIUM CHLORIDE 125 ML/HR: 5; 450 INJECTION, SOLUTION INTRAVENOUS at 00:26

## 2025-07-22 RX ADMIN — PANTOPRAZOLE SODIUM 40 MG: 40 TABLET, DELAYED RELEASE ORAL at 06:30

## 2025-07-22 RX ADMIN — LORAZEPAM 1 MG: 1 TABLET ORAL at 06:29

## 2025-07-22 RX ADMIN — PHENOBARBITAL SODIUM 65 MG: 65 INJECTION INTRAMUSCULAR; INTRAVENOUS at 00:29

## 2025-07-22 NOTE — OUTREACH NOTE
Prep Survey      Flowsheet Row Responses   [facility] patient discharged from? [place]   Is LACE score < 7 ? Yes   Eligibility [facility]   Date of Admission 07/21/25   Date of Discharge 07/22/25   Discharge Disposition Home or Self Care   Discharge diagnosis Acute Alcoholic Ketoacidosis   Does the patient have one of the following disease processes/diagnoses(primary or secondary)? Other   Does the patient have Home health ordered? No   Is there a DME ordered? No   Prep survey completed? Yes            CECILIO DOBBS - Registered Nurse

## 2025-07-22 NOTE — PLAN OF CARE
Goal Outcome Evaluation:  Plan of Care Reviewed With: patient        Progress: improving  Outcome Evaluation: Pt discharge, Started Librium TODAY, NO DRIVING UNTIL PROPER FOLLOW-UP. no n/v.

## 2025-07-22 NOTE — CONSULTS
[facility] and  talked with patient [facility] found a treatment facility to accept and  patient. Patient wanted to go home. Patient talked with facility owner decided he still wanted to go home for a week. Patient was given both phone numbers for facility and APSS. Dr. Richardson discharged patient.

## 2025-07-22 NOTE — CONSULTS
[facility] received a consult request on the patient. The patient doesn't want any recovery.  so I offered resources. The patient is willing to talk with me tomorrow he stated that he doesn't want any recovery.  I asked him if he would just be willing to listen, that at one time I had been in his shoes and that on August 4th I would be celebrating 12 years of sobriety. That just like him someone came to me in an institution and offered me information. He stated that he wanted to rest , that he would listen tomorrow. I will return in the afternoon after 2 tomorrow. I will make calls and gather resources and look for placements today just in case. Bon Jessica from [facility] is willing to come and pick him up if he is willing.

## 2025-07-22 NOTE — DISCHARGE SUMMARY
"Alvarado Woods  2000  [id number]    Hospitalists Discharge Summary    Date of Admission: 7/21/2025  Date of Discharge:  7/22/2025    Primary Discharge Diagnoses:  Acute Alcoholic Ketoacidosis  Intractable Nausea and Vomiting  Lactic Acidosis due to Dehydration  Polycythemia due to Hemoconcentration  Hepatic Steatosis  Suspected Esophagitis/GERD    Secondary Discharge Diagnoses:  Tobacco Abuse  Insomnia  Anxiety  Hx/O Illicit Drug Use    History of Present Illness (taken from H&P):  Patient is a 24-year-old male who presented to the emergency department secondary to 2 days of intractable nausea and vomiting with some hematemesis, epigastric pain.  He admits to drinking a pint of whiskey daily for the past 3 years, last drink 6 pm today.  He notes that he has never gone to rehab and refuses to go at this time.  He declines to talk to psychiatric provider.  He denies SI/HI.  He states he just wants to be put \"back on that same medicine\" to help with alcohol withdrawal.  He smokes over 1 pack of cigarettes per day and denies illicit drug use. He otherwise has no plan for abstinence. At time of my exam he is quite tremulous.     Significant findings in ER include trace acetone, phosphorus 2.2, WBC 11.59, hemoglobin 19.4, ethanol 40, CO2 17.7, ALT 75, AST 47, total bilirubin 2.1, lactate 4.6  CTAP shows small hiatal hernia with distal esophageal wall thickening, possible esophagitis, marked hepatic steatosis, see full report in epic     In the ER he was given morphine 4 mg IV, Rocephin 1 g, Versed 1 mg IV, Zofran 4 mg IV, Protonix 40 mg IV, 1 L saline bolus, D5  mL/h, Phos-Nak, thiamine and admission was requested     He has a history of GERD, anxiety, tobacco abuse, daily alcohol use, illicit drug use, insomnia     He otherwise currently denies f/c/headache/rhinorrhea/nasal congestion/lightheadedness/syncopal sensation/cough/soa/diarrhea/chest pain/recent illness/sick exposures/change in bowel or bladder " "habits/no weight change/bloody stools/change in medications or any other new concerns. Denies SI/HI.    Hospital Course:  Mr. Woods was admitted to the medical/surgical unit.  He was aggressively fluid rehydrated.  He was continued to be monitored on the alcohol withdrawal protocol.  Although initially n.p.o., I slowly advanced his diet and it was tolerated.  Laboratory studies normalized.    He repeatedly refused mental health assistance, rehab, substance abuse counseling.  The counselor met with him today, and offered him a spot at a facility in [facility] to help with rehab.  Mr. Woods refused this but said he would call in about a week.  I explained to Mr. Woods all complications that are arising from his continued alcohol abuse.  He kept insisting only needed was the \"medicine\".  Explained to him that I would write him a prescription for the medication, but he should strongly consider rehabilitation.  He continued to deny this.  Furthermore, he denied prescription for NicoDerm as he had no interest in tobacco cessation.      I extensively counseled the patient that he is not to drive until he is cleared by his primary care physician.  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  I explained to him that the Librium taper can cause drowsiness and he should not be operating machinery.  Furthermore, for those same reasons, he should not be driving.  I also advised him not to consume alcohol while taking the Librium taper.  Although frustrated, he expressed understanding.  Given additional findings on CT scan, I did prescribe Protonix for him at discharge.      PCP  Patient Care Team:  Nicholas Ding DO as PCP - General (Family Medicine)    Consults:   Consults       No orders found for last 30 day(s).            Operations and Procedures Performed:       CT Abdomen Pelvis " With Contrast  Result Date: 7/21/2025  Narrative: CT ABDOMEN PELVIS W CONTRAST Date of Exam: 7/21/2025 4:14 AM EDT Indication: abd paiin, epigastric pain, hx AUD, n/v. Comparison: None available. Technique: Axial CT images were obtained of the abdomen and pelvis following the uneventful intravenous administration of iodinated contrast. Sagittal and coronal reconstructions were performed.  Automated exposure control and iterative reconstruction methods were used. Findings: Heart size is normal. There is a small hiatal hernia and there is distal esophageal wall thickening with prominent adjacent lymph nodes. This could relate to esophagitis. Lung bases are clear. Tiny fat-containing periumbilical hernia. No acute osseous abnormality or significant degenerative change. There is marked hepatic steatosis. The gallbladder, bile ducts, pancreas, mid and distal stomach, duodenum, spleen, adrenal glands, kidneys, ureters and prostate gland all appear normal. Normal appendix. Colon and small bowel appear normal. Vasculature is normal. No ascites, pneumoperitoneum or lymphadenopathy.     Impression: Impression: 1.Small hiatal hernia with distal esophageal wall thickening and prominent adjacent lymph nodes. This could relate to esophagitis. 2.Marked hepatic steatosis. Electronically Signed: Fred Joshi MD  7/21/2025 4:54 AM EDT  Workstation ID: [id number]    XR Hand 3+ View Right  Result Date: 7/20/2025  Narrative: XR HAND 3+ VW RIGHT Date of Exam: 7/20/2025 12:30 PM EDT Indication: fell on right hand and injured it Comparison: None available. Findings: No evidence of acute fracture. Normal joint alignment. No significant degenerative changes. Soft tissues are unremarkable.     Impression: Impression: Negative right hand radiographs. Electronically Signed: Fred Ramirez MD  7/20/2025 12:54 PM EDT  Workstation ID: [id number]      Allergies:  has no known allergies.    Ananth  reviewed    Discharge Medications:     Discharge  Medications        New Medications        Instructions Start Date   chlordiazePOXIDE 25 MG capsule  Commonly known as: LIBRIUM   Take 2 capsules by mouth Every 6 (Six) Hours for 1 day, THEN 2 capsules Every 8 (Eight) Hours for 1 day, THEN 2 capsules Every 12 (Twelve) Hours for 1 day, THEN 2 capsules Every Night for 1 day.   Start Date: July 22, 2025     pantoprazole 40 MG EC tablet  Commonly known as: PROTONIX   40 mg, Oral, Every Early Morning   Start Date: July 23, 2025              Last Lab Results:   Lab Results (most recent)       Procedure Component Value Units Date/Time    Comprehensive Metabolic Panel [[id number]]  (Abnormal) Collected: 07/22/25 0358    Specimen: Blood Updated: 07/22/25 0444     Glucose 107 mg/dL      BUN 6.6 mg/dL      Creatinine 0.90 mg/dL      Sodium 134 mmol/L      Potassium 3.7 mmol/L      Chloride 99 mmol/L      CO2 23.8 mmol/L      Calcium 9.0 mg/dL      Total Protein 5.9 g/dL      Albumin 3.8 g/dL      ALT (SGPT) 40 U/L      AST (SGOT) 25 U/L      Alkaline Phosphatase 48 U/L      Total Bilirubin 1.1 mg/dL      Globulin 2.1 gm/dL      A/G Ratio 1.8 g/dL      BUN/Creatinine Ratio 7.3     Anion Gap 11.2 mmol/L      eGFR 123.1 mL/min/1.73     Narrative:      GFR Categories in Chronic Kidney Disease (CKD)              GFR Category          GFR (mL/min/1.73)    Interpretation  G1                    90 or greater        Normal or high (1)  G2                    60-89                Mild decrease (1)  G3a                   45-59                Mild to moderate decrease  G3b                   30-44                Moderate to severe decrease  G4                    15-29                Severe decrease  G5                    14 or less           Kidney failure    (1)In the absence of evidence of kidney disease, neither GFR category G1 or G2 fulfill the criteria for CKD.    eGFR calculation 2021 CKD-EPI creatinine equation, which does not include race as a factor    CBC Auto Differential  [[id number]]  (Abnormal) Collected: 07/22/25 0358    Specimen: Blood Updated: 07/22/25 0417     WBC 6.56 10*3/mm3      RBC 4.42 10*6/mm3      Hemoglobin 15.6 g/dL      Hematocrit 43.4 %      MCV 98.2 fL      MCH 35.3 pg      MCHC 35.9 g/dL      RDW 11.3 %      RDW-SD 40.7 fl      MPV 9.7 fL      Platelets 175 10*3/mm3      Neutrophil % 70.0 %      Lymphocyte % 17.7 %      Monocyte % 11.4 %      Eosinophil % 0.3 %      Basophil % 0.3 %      Immature Grans % 0.3 %      Neutrophils, Absolute 4.59 10*3/mm3      Lymphocytes, Absolute 1.16 10*3/mm3      Monocytes, Absolute 0.75 10*3/mm3      Eosinophils, Absolute 0.02 10*3/mm3      Basophils, Absolute 0.02 10*3/mm3      Immature Grans, Absolute 0.02 10*3/mm3      nRBC 0.0 /100 WBC     Blood Culture - Blood, Blood, Venous [[id number]]  (Normal) Collected: 07/21/25 0407    Specimen: Blood, Venous Updated: 07/22/25 0415     Blood Culture No growth at 24 hours    Blood Culture - Blood, Blood, Venous [[id number]]  (Normal) Collected: 07/21/25 0340    Specimen: Blood, Venous Updated: 07/22/25 0400     Blood Culture No growth at 24 hours    STAT Lactic Acid, Reflex [[id number]]  (Normal) Collected: 07/21/25 0711    Specimen: Blood Updated: 07/21/25 0733     Lactate 1.1 mmol/L     Urine Drug Screen - Urine, Clean Catch [[id number]]  (Abnormal) Collected: 07/21/25 0601    Specimen: Urine, Clean Catch Updated: 07/21/25 0622     THC, Screen, Urine Negative     Phencyclidine (PCP), Urine Negative     Cocaine Screen, Urine Negative     Methamphetamine, Ur Negative     Opiate Screen Positive     Amphetamine Screen, Urine Negative     Benzodiazepine Screen, Urine Positive     Tricyclic Antidepressants Screen Negative     Methadone Screen, Urine Negative     Barbiturates Screen, Urine Negative     Oxycodone Screen, Urine Negative     Buprenorphine, Screen, Urine Negative    Narrative:      Cutoff For Drugs Screened:    Amphetamines               500 ng/ml  Barbiturates               200  ng/ml  Benzodiazepines            150 ng/ml  Cocaine                    150 ng/ml  Methadone                  200 ng/ml  Opiates                    100 ng/ml  Phencyclidine               25 ng/ml  THC                         50 ng/ml  Methamphetamine            500 ng/ml  Tricyclic Antidepressants  300 ng/ml  Oxycodone                  100 ng/ml  Buprenorphine               10 ng/ml    The normal value for all drugs tested is negative. This report includes unconfirmed screening results, with the cutoff values listed, to be used for medical treatment purposes only.  Unconfirmed results must not be used for non-medical purposes such as employment or legal testing.  Clinical consideration should be applied to any drug of abuse test, particularly when unconfirmed results are used.      Urinalysis With Culture If Indicated - Urine, Clean Catch [[id number]]  (Abnormal) Collected: 07/21/25 0613    Specimen: Urine, Clean Catch Updated: 07/21/25 0617     Color, UA Yellow     Appearance, UA Clear     pH, UA 6.0     Specific Gravity, UA 1.010     Glucose, UA Negative     Ketones, UA >=160 mg/dL (4+)     Bilirubin, UA Negative     Blood, UA Negative     Protein, UA Negative     Leuk Esterase, UA Negative     Nitrite, UA Negative     Urobilinogen, UA 0.2 E.U./dL    Narrative:      In absence of clinical symptoms, the presence of pyuria, bacteria, and/or nitrites on the urinalysis result does not correlate with infection.  Urine microscopic not indicated.    Lactic Acid, Plasma [[id number]]  (Abnormal) Collected: 07/21/25 0407    Specimen: Blood Updated: 07/21/25 0432     Lactate 4.6 mmol/L     Acetone [[id number]]  (Abnormal) Collected: 07/21/25 0340    Specimen: Blood Updated: 07/21/25 0430     Acetone Trace    Comprehensive Metabolic Panel [[id number]]  (Abnormal) Collected: 07/21/25 0340    Specimen: Blood Updated: 07/21/25 0410     Glucose 74 mg/dL      BUN 14.0 mg/dL      Creatinine 1.07 mg/dL      Sodium 135 mmol/L       Potassium 3.7 mmol/L      Chloride 91 mmol/L      CO2 17.7 mmol/L      Calcium 9.8 mg/dL      Total Protein 8.0 g/dL      Albumin 5.1 g/dL      ALT (SGPT) 75 U/L      AST (SGOT) 47 U/L      Alkaline Phosphatase 69 U/L      Total Bilirubin 2.1 mg/dL      Globulin 2.9 gm/dL      A/G Ratio 1.8 g/dL      BUN/Creatinine Ratio 13.1     Anion Gap 26.3 mmol/L      eGFR 100.0 mL/min/1.73     Narrative:      GFR Categories in Chronic Kidney Disease (CKD)              GFR Category          GFR (mL/min/1.73)    Interpretation  G1                    90 or greater        Normal or high (1)  G2                    60-89                Mild decrease (1)  G3a                   45-59                Mild to moderate decrease  G3b                   30-44                Moderate to severe decrease  G4                    15-29                Severe decrease  G5                    14 or less           Kidney failure    (1)In the absence of evidence of kidney disease, neither GFR category G1 or G2 fulfill the criteria for CKD.    eGFR calculation 2021 CKD-EPI creatinine equation, which does not include race as a factor    Lipase [[id number]]  (Normal) Collected: 07/21/25 0340    Specimen: Blood Updated: 07/21/25 0410     Lipase 25 U/L     Ethanol [[id number]]  (Abnormal) Collected: 07/21/25 0340    Specimen: Blood Updated: 07/21/25 0410     Ethanol 40 mg/dL      Ethanol % 0.040 %     Narrative:      Not for legal purposes.    Magnesium [[id number]]  (Normal) Collected: 07/21/25 0340    Specimen: Blood Updated: 07/21/25 0410     Magnesium 2.2 mg/dL     Phosphorus [[id number]]  (Abnormal) Collected: 07/21/25 0340    Specimen: Blood Updated: 07/21/25 0410     Phosphorus 2.2 mg/dL     CBC & Differential [[id number]]  (Abnormal) Collected: 07/21/25 0340    Specimen: Blood Updated: 07/21/25 0355    Narrative:      The following orders were created for panel order CBC & Differential.  Procedure                               Abnormality         Status                      ---------                               -----------         ------                     CBC Auto Differential[[id number]]        Abnormal            Final result               Scan Slide[[id number]]                                                                    Please view results for these tests on the individual orders.    CBC Auto Differential [[id number]]  (Abnormal) Collected: 07/21/25 0340    Specimen: Blood Updated: 07/21/25 0355     WBC 11.59 10*3/mm3      RBC 5.49 10*6/mm3      Hemoglobin 19.4 g/dL      Hematocrit 51.8 %      MCV 94.4 fL      MCH 35.3 pg      MCHC 37.5 g/dL      RDW 11.4 %      RDW-SD 39.6 fl      MPV 9.6 fL      Platelets 314 10*3/mm3      Neutrophil % 78.5 %      Lymphocyte % 12.6 %      Monocyte % 7.9 %      Eosinophil % 0.1 %      Basophil % 0.4 %      Immature Grans % 0.5 %      Neutrophils, Absolute 9.09 10*3/mm3      Lymphocytes, Absolute 1.46 10*3/mm3      Monocytes, Absolute 0.92 10*3/mm3      Eosinophils, Absolute 0.01 10*3/mm3      Basophils, Absolute 0.05 10*3/mm3      Immature Grans, Absolute 0.06 10*3/mm3      nRBC 0.0 /100 WBC           Imaging Results (Most Recent)       Procedure Component Value Units Date/Time    CT Abdomen Pelvis With Contrast [[id number]] Collected: 07/21/25 0450     Updated: 07/21/25 0457    Narrative:      CT ABDOMEN PELVIS W CONTRAST    Date of Exam: 7/21/2025 4:14 AM EDT    Indication: abd paiin, epigastric pain, hx AUD, n/v.    Comparison: None available.    Technique: Axial CT images were obtained of the abdomen and pelvis following the uneventful intravenous administration of iodinated contrast. Sagittal and coronal reconstructions were performed.  Automated exposure control and iterative reconstruction   methods were used.        Findings:  Heart size is normal. There is a small hiatal hernia and there is distal esophageal wall thickening with prominent adjacent lymph nodes. This could relate to esophagitis. Lung bases are clear.  Tiny fat-containing periumbilical hernia. No acute osseous   abnormality or significant degenerative change.    There is marked hepatic steatosis. The gallbladder, bile ducts, pancreas, mid and distal stomach, duodenum, spleen, adrenal glands, kidneys, ureters and prostate gland all appear normal. Normal appendix. Colon and small bowel appear normal. Vasculature   is normal. No ascites, pneumoperitoneum or lymphadenopathy.      Impression:      Impression:  1.Small hiatal hernia with distal esophageal wall thickening and prominent adjacent lymph nodes. This could relate to esophagitis.  2.Marked hepatic steatosis.                Electronically Signed: Fred Joshi MD    7/21/2025 4:54 AM EDT    Workstation ID: [id number]            PROCEDURES      Condition on Discharge: Stable    Physical Exam at Discharge  Vital Signs  Temp:  [97.1 °F (36.2 °C)-98.3 °F (36.8 °C)] 98.2 °F (36.8 °C)  Heart Rate:  [] 100  Resp:  [18-20] 18  BP: (132-150)/(68-86) 146/84    Physical Exam:  Physical Exam   Constitutional: Patient appears well-developed and well-nourished and in no acute distress   Cardiovascular: Regular rate, regular rhythm, S1 normal and S2 normal.  Exam reveals no gallop and no friction rub.  No murmur heard.  Radial pulses are 2+ and symmetric.  Pulmonary/Chest: Lungs are clear to auscultation bilaterally. No respiratory distress. No wheezes. No rhonchi. No rales.   Abdominal: Soft. Bowel sounds are normal. There is no tenderness.   Musculoskeletal: Normal Muscle tone  Extremities: No edema.   Neurological: Patient is alert and oriented to person, place, and time. Cranial nerves II-XII are grossly intact with no focal deficits.  Skin: Skin is warm. No rash noted. Multiple tattoos.    Discharge Disposition  Home    Visiting Nurse:    No     Home PT/OT:  No     Home Safety Evaluation:  No     DME  None    Discharge Diet:      Dietary Orders (From admission, onward)       Start     Ordered    07/21/25 8386  Diet:  Regular/House; Fluid Consistency: Thin (IDDSI 0)  Diet Effective Now        References:    Diet Order Definitions   Question Answer Comment   Diets: Regular/House    Fluid Consistency: Thin (IDDSI 0)        07/21/25 1515                    Activity at Discharge:  No driving.  No operating machinery.    Pre-discharge education  EtOH, Tobacco      Follow-up Appointments  No future appointments.  Additional Instructions for the Follow-ups that You Need to Schedule       Discharge Follow-up with PCP   As directed       Currently Documented PCP:    Nicholas Ding DO    PCP Phone Number:    [phone]     Follow Up Details: Within 1 week                Test Results Pending at Discharge  Pending Labs       Order Current Status    Blood Culture - Blood, Blood, Venous Preliminary result    Blood Culture - Blood, Blood, Venous Preliminary result             Efrain Richardson MD  07/22/25  14:20 EDT    Time: <30 minutes

## 2025-07-23 ENCOUNTER — TRANSITIONAL CARE MANAGEMENT TELEPHONE ENCOUNTER (OUTPATIENT)
Dept: CALL CENTER | Facility: HOSPITAL | Age: 25
End: 2025-07-23
Payer: COMMERCIAL

## 2025-07-23 NOTE — CASE MANAGEMENT/SOCIAL WORK
Case Management Discharge Note      Final Note: DC'd home         Selected Continued Care - Discharged on 7/22/2025 Admission date: 7/21/2025 - Discharge disposition: Home or Self Care      Destination    No services have been selected for the patient.                Durable Medical Equipment    No services have been selected for the patient.                Dialysis/Infusion    No services have been selected for the patient.                Home Medical Care    No services have been selected for the patient.                Therapy    No services have been selected for the patient.                Community Resources    No services have been selected for the patient.                Community & DME    No services have been selected for the patient.                         Final Discharge Disposition Code: 01 - home or self-care

## 2025-07-23 NOTE — OUTREACH NOTE
Call Center TCM Note      Flowsheet Row Responses   [facility] patient discharged from? [place]   Does the patient have one of the following disease processes/diagnoses(primary or secondary)? Other   TCM attempt successful? No   Unsuccessful attempts Attempt 1            Andra Aponte Registered Nurse    7/23/2025, 09:13 EDT

## 2025-07-23 NOTE — OUTREACH NOTE
Call Center TCM Note      Flowsheet Row Responses   [facility] patient discharged from? [place]   Does the patient have one of the following disease processes/diagnoses(primary or secondary)? Other   TCM attempt successful? Yes   Call start time 1451   Call end time 1452   Discharge diagnosis Acute Alcoholic Ketoacidosis   Person spoke with today (if not patient) and relationship Julianna-mother   Meds reviewed with patient/caregiver? Yes   Is the patient having any side effects they believe may be caused by any medication additions or changes? No   Does the patient have all medications ordered at discharge? Yes   Is the patient taking all medications as directed (includes completed medication regime)? Yes   Does the patient have an appointment with their PCP within 7-14 days of discharge? No   Nursing Interventions --  [Pt is planning to go to rehab tomorrow]   Has home health visited the patient within 72 hours of discharge? N/A   Psychosocial issues? No   What is the patient's perception of their health status since discharge? Improving   TCM call completed? Yes   Wrap up additional comments Mother reports pt is planning to go to rehab tomorrow and is doing well today   Call end time 1452   Would this patient benefit from a Referral to Amb Social Work? No   Is the patient interested in additional calls from an ambulatory ? No            Andra KUMARI - Registered Nurse    7/23/2025, 14:53 EDT

## 2025-07-26 LAB
BACTERIA SPEC AEROBE CULT: NORMAL
BACTERIA SPEC AEROBE CULT: NORMAL

## (undated) DEVICE — SUT ETHLN 4/0 PS2 PLSTC 1667G

## (undated) DEVICE — NDL HYPO PRECISIONGLIDE REG 25G 1 1/2

## (undated) DEVICE — LOU MINOR PROCEDURE: Brand: MEDLINE INDUSTRIES, INC.

## (undated) DEVICE — SUT MNCRYL PLS ANTIB UD 4/0 PS2 18IN

## (undated) DEVICE — PREMIUM WET SKIN PREP TRAY: Brand: MEDLINE INDUSTRIES, INC.

## (undated) DEVICE — SUT VIC 3/0 CTI 36IN J944H

## (undated) DEVICE — IRRIGATOR BULB ASEPTO 60CC STRL

## (undated) DEVICE — PATIENT RETURN ELECTRODE, SINGLE-USE, CONTACT QUALITY MONITORING, ADULT, WITH 9FT CORD, FOR PATIENTS WEIGING OVER 33LBS. (15KG): Brand: MEGADYNE

## (undated) DEVICE — DRSNG SURESITE WNDW 4X4.5

## (undated) DEVICE — GLV SURG PREMIERPRO ORTHO LTX PF SZ7.5 BRN

## (undated) DEVICE — SPNG GZ WOVN 4X4IN 12PLY 10/BX STRL

## (undated) DEVICE — TBG PENCL TELESCP MEGADYNE SMOKE EVAC 10FT

## (undated) DEVICE — TRAP FLD MINIVAC MEGADYNE 100ML